# Patient Record
Sex: MALE | Race: WHITE | Employment: FULL TIME | URBAN - METROPOLITAN AREA
[De-identification: names, ages, dates, MRNs, and addresses within clinical notes are randomized per-mention and may not be internally consistent; named-entity substitution may affect disease eponyms.]

---

## 2017-05-12 ENCOUNTER — APPOINTMENT (OUTPATIENT)
Dept: GENERAL RADIOLOGY | Age: 72
End: 2017-05-12
Attending: FAMILY MEDICINE
Payer: COMMERCIAL

## 2017-05-12 ENCOUNTER — HOSPITAL ENCOUNTER (OUTPATIENT)
Age: 72
Discharge: HOME | End: 2017-05-12
Attending: FAMILY MEDICINE
Payer: COMMERCIAL

## 2017-05-12 VITALS
TEMPERATURE: 97 F | RESPIRATION RATE: 24 BRPM | DIASTOLIC BLOOD PRESSURE: 93 MMHG | HEART RATE: 63 BPM | SYSTOLIC BLOOD PRESSURE: 134 MMHG | OXYGEN SATURATION: 96 %

## 2017-05-12 DIAGNOSIS — J40 BRONCHITIS: Primary | ICD-10-CM

## 2017-05-12 PROCEDURE — 94640 AIRWAY INHALATION TREATMENT: CPT

## 2017-05-12 PROCEDURE — 99214 OFFICE O/P EST MOD 30 MIN: CPT

## 2017-05-12 PROCEDURE — 71020 XR CHEST PA + LAT CHEST (CPT=71020): CPT | Performed by: FAMILY MEDICINE

## 2017-05-12 RX ORDER — IPRATROPIUM BROMIDE AND ALBUTEROL SULFATE 2.5; .5 MG/3ML; MG/3ML
3 SOLUTION RESPIRATORY (INHALATION) ONCE
Status: COMPLETED | OUTPATIENT
Start: 2017-05-12 | End: 2017-05-12

## 2017-05-12 RX ORDER — ALBUTEROL SULFATE 2.5 MG/3ML
2.5 SOLUTION RESPIRATORY (INHALATION) EVERY 4 HOURS PRN
Qty: 30 AMPULE | Refills: 0 | Status: SHIPPED | OUTPATIENT
Start: 2017-05-12 | End: 2018-04-14

## 2017-05-12 RX ORDER — PREDNISONE 20 MG/1
40 TABLET ORAL DAILY
Qty: 10 TABLET | Refills: 0 | Status: SHIPPED | OUTPATIENT
Start: 2017-05-12 | End: 2017-05-17

## 2017-05-12 RX ORDER — ALBUTEROL SULFATE 90 UG/1
2 AEROSOL, METERED RESPIRATORY (INHALATION) EVERY 6 HOURS PRN
Qty: 1 INHALER | Refills: 0 | Status: SHIPPED | OUTPATIENT
Start: 2017-05-12 | End: 2017-10-22

## 2017-05-12 RX ORDER — AZITHROMYCIN 250 MG/1
TABLET, FILM COATED ORAL
Qty: 6 TABLET | Refills: 0 | Status: SHIPPED | OUTPATIENT
Start: 2017-05-12 | End: 2017-10-14

## 2017-05-12 NOTE — ED PROVIDER NOTES
Patient Seen in: THE [facility] In [place]    History   Patient presents with:  Shortness Of Breath    Stated Complaint: sob    HPI    42-year-old male presents for cough, congestion and shortness of breath.   States he started with cold sympto complaint: sob  Other systems are as noted in HPI. Constitutional and vital signs reviewed. All other systems reviewed and negative except as noted above. PSFH elements reviewed from today and agreed except as otherwise stated in HPI.     Physical solution for nebulizer  Monitor for worsening symptoms, follow-up with the PCP      Disposition and Plan     Clinical Impression:  Bronchitis  (primary encounter diagnosis)    Disposition:  Discharge    Follow-up:  Nonstaff, Physician  Edmar Rowe 59  Na

## 2017-05-12 NOTE — ED INITIAL ASSESSMENT (HPI)
Complains of head and nose congestion for two weeks. Started having cough and shortness of breath a week ago. States yesterday progressively got worse. Has shortness of breath on exertion.

## 2017-10-14 ENCOUNTER — HOSPITAL ENCOUNTER (OUTPATIENT)
Age: 72
Discharge: HOME | End: 2017-10-14
Attending: FAMILY MEDICINE
Payer: COMMERCIAL

## 2017-10-14 ENCOUNTER — APPOINTMENT (OUTPATIENT)
Dept: GENERAL RADIOLOGY | Age: 72
End: 2017-10-14
Attending: FAMILY MEDICINE
Payer: COMMERCIAL

## 2017-10-14 VITALS
SYSTOLIC BLOOD PRESSURE: 134 MMHG | BODY MASS INDEX: 19.64 KG/M2 | DIASTOLIC BLOOD PRESSURE: 86 MMHG | RESPIRATION RATE: 20 BRPM | WEIGHT: 145 LBS | HEART RATE: 82 BPM | OXYGEN SATURATION: 99 % | HEIGHT: 72 IN

## 2017-10-14 DIAGNOSIS — J44.1 COPD EXACERBATION (HCC): Primary | ICD-10-CM

## 2017-10-14 DIAGNOSIS — J40 BRONCHITIS: ICD-10-CM

## 2017-10-14 PROCEDURE — 94640 AIRWAY INHALATION TREATMENT: CPT

## 2017-10-14 PROCEDURE — 71020 XR CHEST PA + LAT CHEST (CPT=71020): CPT | Performed by: FAMILY MEDICINE

## 2017-10-14 PROCEDURE — 96374 THER/PROPH/DIAG INJ IV PUSH: CPT

## 2017-10-14 PROCEDURE — 99214 OFFICE O/P EST MOD 30 MIN: CPT

## 2017-10-14 RX ORDER — ALBUTEROL SULFATE 2.5 MG/3ML
2.5 SOLUTION RESPIRATORY (INHALATION) ONCE
Status: COMPLETED | OUTPATIENT
Start: 2017-10-14 | End: 2017-10-14

## 2017-10-14 RX ORDER — METHYLPREDNISOLONE SODIUM SUCCINATE 125 MG/2ML
125 INJECTION, POWDER, LYOPHILIZED, FOR SOLUTION INTRAMUSCULAR; INTRAVENOUS ONCE
Status: COMPLETED | OUTPATIENT
Start: 2017-10-14 | End: 2017-10-14

## 2017-10-14 RX ORDER — IPRATROPIUM BROMIDE AND ALBUTEROL SULFATE 2.5; .5 MG/3ML; MG/3ML
3 SOLUTION RESPIRATORY (INHALATION) ONCE
Status: COMPLETED | OUTPATIENT
Start: 2017-10-14 | End: 2017-10-14

## 2017-10-14 RX ORDER — PREDNISONE 10 MG/1
TABLET ORAL
Qty: 25 TABLET | Refills: 0 | Status: SHIPPED | OUTPATIENT
Start: 2017-10-15 | End: 2017-10-22

## 2017-10-14 RX ORDER — LEVOFLOXACIN 500 MG/1
500 TABLET, FILM COATED ORAL DAILY
Qty: 10 TABLET | Refills: 0 | Status: SHIPPED | OUTPATIENT
Start: 2017-10-14 | End: 2017-10-22

## 2017-10-14 RX ORDER — ALBUTEROL SULFATE 2.5 MG/3ML
2.5 SOLUTION RESPIRATORY (INHALATION) EVERY 4 HOURS PRN
Qty: 30 AMPULE | Refills: 0 | Status: SHIPPED | OUTPATIENT
Start: 2017-10-14 | End: 2017-10-22

## 2017-10-14 RX ORDER — ALBUTEROL SULFATE 90 UG/1
2 AEROSOL, METERED RESPIRATORY (INHALATION) EVERY 6 HOURS PRN
Qty: 1 INHALER | Refills: 0 | Status: SHIPPED | OUTPATIENT
Start: 2017-10-14 | End: 2017-11-13

## 2017-10-14 NOTE — ED INITIAL ASSESSMENT (HPI)
Shortness of breath-  X 3 weeks, denies fever or chills. Denies any chest pain. Pt uses his inhaler 1-2 x per day. , he ran out of the nebulizer medication  X 4 weeks.  Pt has h/o pneumonia 5 yrs ago

## 2017-10-14 NOTE — ED NOTES
Dr ordered to do a walking test to see if O2 changed, pt O2 stayed fine, was at 99% the whole time. Pt states he feels better after the 2nd dose of albuterol treatment.

## 2017-10-14 NOTE — ED PROVIDER NOTES
Patient Seen in: [facility] In [facility]    History   Patient presents with:  Breathing Problem    Stated Complaint: SOB X 3 DAYS    HPI  75 yo M here with complaints of being short of breath for the last 3 days, patient has had pneumonia in [place]  ABD: not distended  NEURO: Alert and oriented to person place and time  GAIT: Normal    ED Course   Labs Reviewed - No data to display      Orders Placed This Encounter      XR CHEST PA + LAT CHEST (CPT=71020) Once          Order Comments: that he has had a productive cough, congestion, and shortness of breath for three weeks. Patient states that he is a current smoker. FINDINGS:  LUNGS:  Emphysematous changes are noted. No consolidations. CARDIAC:  Normal size cardiac silhouette.  MEDIAST Soln  Take 3 mL (2.5 mg total) by nebulization every 4 (four) hours as needed for Wheezing or Shortness of Breath., Normal, Disp-30 ampule, R-0    !! predniSONE 10 MG Oral Tab  Day 2: 50mg/5pills, Day 3,4: 40 mg/4 pills, Day 5,6: 30mg/3 pills, Day 7,8: 20m

## 2017-10-20 ENCOUNTER — ANESTHESIA EVENT (OUTPATIENT)
Dept: ENDOSCOPY | Facility: HOSPITAL | Age: 72
DRG: 377 | End: 2017-10-20
Payer: COMMERCIAL

## 2017-10-20 ENCOUNTER — APPOINTMENT (OUTPATIENT)
Dept: GENERAL RADIOLOGY | Facility: HOSPITAL | Age: 72
DRG: 377 | End: 2017-10-20
Attending: EMERGENCY MEDICINE
Payer: COMMERCIAL

## 2017-10-20 ENCOUNTER — HOSPITAL ENCOUNTER (INPATIENT)
Facility: HOSPITAL | Age: 72
LOS: 2 days | Discharge: HOME | DRG: 377 | End: 2017-10-22
Attending: EMERGENCY MEDICINE | Admitting: HOSPITALIST
Payer: COMMERCIAL

## 2017-10-20 DIAGNOSIS — R63.4 WEIGHT LOSS: ICD-10-CM

## 2017-10-20 DIAGNOSIS — K92.1 MELENA: ICD-10-CM

## 2017-10-20 DIAGNOSIS — K92.2 GASTROINTESTINAL HEMORRHAGE, UNSPECIFIED GASTROINTESTINAL HEMORRHAGE TYPE: Primary | ICD-10-CM

## 2017-10-20 DIAGNOSIS — D64.9 ANEMIA, UNSPECIFIED TYPE: ICD-10-CM

## 2017-10-20 LAB
ALBUMIN SERPL-MCNC: 2.6 G/DL (ref 3.5–4.8)
ALP LIVER SERPL-CCNC: 59 U/L (ref 45–117)
ALT SERPL-CCNC: 17 U/L (ref 17–63)
ANTIBODY SCREEN: NEGATIVE
APTT PPP: 22.4 SECONDS (ref 25–34)
AST SERPL-CCNC: 12 U/L (ref 15–41)
ATRIAL RATE: 83 BPM
BASOPHILS # BLD AUTO: 0.01 X10(3) UL (ref 0–0.1)
BASOPHILS NFR BLD AUTO: 0.1 %
BILIRUB SERPL-MCNC: 0.6 MG/DL (ref 0.1–2)
BUN BLD-MCNC: 46 MG/DL (ref 8–20)
CALCIUM BLD-MCNC: 7.6 MG/DL (ref 8.3–10.3)
CHLORIDE: 102 MMOL/L (ref 101–111)
CO2: 29 MMOL/L (ref 22–32)
CREAT BLD-MCNC: 0.84 MG/DL (ref 0.7–1.3)
DEPRECATED HBV CORE AB SER IA-ACNC: 18.2 NG/ML (ref 22–322)
EOSINOPHIL # BLD AUTO: 0.02 X10(3) UL (ref 0–0.3)
EOSINOPHIL NFR BLD AUTO: 0.2 %
ERYTHROCYTE [DISTWIDTH] IN BLOOD BY AUTOMATED COUNT: 17 % (ref 11.5–16)
FOLATE (FOLIC ACID), SERUM: 10 NG/ML (ref 8.7–24)
GLUCOSE BLD-MCNC: 108 MG/DL (ref 70–99)
HAPTOGLOBIN: 147 MG/DL (ref 30–200)
HAV AB SERPL IA-ACNC: 827 PG/ML (ref 193–986)
HCT VFR BLD AUTO: 17.5 % (ref 37–53)
HGB BLD-MCNC: 5.5 G/DL (ref 13–17)
HGB BLD-MCNC: 6.9 G/DL (ref 13–17)
IMMATURE GRANULOCYTE COUNT: 0.09 X10(3) UL (ref 0–1)
IMMATURE GRANULOCYTE RATIO %: 0.9 %
INR BLD: 1.11 (ref 0.89–1.11)
IRON SATURATION: 3 % (ref 13–45)
IRON: 10 UG/DL (ref 45–182)
LDH: 189 U/L (ref 84–249)
LYMPHOCYTES # BLD AUTO: 2.37 X10(3) UL (ref 0.9–4)
LYMPHOCYTES NFR BLD AUTO: 22.7 %
M PROTEIN MFR SERPL ELPH: 4.7 G/DL (ref 6.1–8.3)
MCH RBC QN AUTO: 26.4 PG (ref 27–33.2)
MCHC RBC AUTO-ENTMCNC: 31.4 G/DL (ref 31–37)
MCV RBC AUTO: 84.1 FL (ref 80–99)
MONOCYTES # BLD AUTO: 1.03 X10(3) UL (ref 0.1–0.6)
MONOCYTES NFR BLD AUTO: 9.9 %
NEUTROPHIL ABS PRELIM: 6.91 X10 (3) UL (ref 1.3–6.7)
NEUTROPHILS # BLD AUTO: 6.91 X10(3) UL (ref 1.3–6.7)
NEUTROPHILS NFR BLD AUTO: 66.2 %
P AXIS: 79 DEGREES
P-R INTERVAL: 152 MS
PLATELET # BLD AUTO: 159 10(3)UL (ref 150–450)
POTASSIUM SERPL-SCNC: 4 MMOL/L (ref 3.6–5.1)
PSA SERPL DL<=0.01 NG/ML-MCNC: 14.4 SECONDS (ref 12–14.3)
Q-T INTERVAL: 366 MS
QRS DURATION: 80 MS
QTC CALCULATION (BEZET): 430 MS
R AXIS: -48 DEGREES
RBC # BLD AUTO: 2.08 X10(6)UL (ref 3.8–5.8)
RED CELL DISTRIBUTION WIDTH-SD: 51.6 FL (ref 35.1–46.3)
RETIC ABS CALC AUTO: 84 X10(3) UL (ref 22.5–147.5)
RETIC IRF CALC: 0.32 RATIO (ref 0.09–0.3)
RETIC%: 4 % (ref 0.5–2.5)
RETICULOCYTE HEMOGLOBIN EQUIVALENT: 27.4 PG (ref 28.2–36.3)
RH BLOOD TYPE: POSITIVE
SODIUM SERPL-SCNC: 138 MMOL/L (ref 136–144)
T AXIS: 68 DEGREES
TOTAL IRON BINDING CAPACITY: 335 UG/DL (ref 298–536)
TRANSFERRIN: 225 MG/DL (ref 200–360)
TRANSFERRIN: 225 MG/DL (ref 200–360)
TROPONIN: <0.046 NG/ML (ref ?–0.05)
VENTRICULAR RATE: 83 BPM
WBC # BLD AUTO: 10.4 X10(3) UL (ref 4–13)

## 2017-10-20 PROCEDURE — 99223 1ST HOSP IP/OBS HIGH 75: CPT | Performed by: HOSPITALIST

## 2017-10-20 PROCEDURE — 71010 XR CHEST AP PORTABLE  (CPT=71010): CPT | Performed by: EMERGENCY MEDICINE

## 2017-10-20 RX ORDER — SODIUM CHLORIDE 9 MG/ML
INJECTION, SOLUTION INTRAVENOUS CONTINUOUS
Status: DISCONTINUED | OUTPATIENT
Start: 2017-10-20 | End: 2017-10-22

## 2017-10-20 RX ORDER — SODIUM CHLORIDE 9 MG/ML
INJECTION, SOLUTION INTRAVENOUS ONCE
Status: COMPLETED | OUTPATIENT
Start: 2017-10-20 | End: 2017-10-20

## 2017-10-20 RX ORDER — IPRATROPIUM BROMIDE AND ALBUTEROL SULFATE 2.5; .5 MG/3ML; MG/3ML
3 SOLUTION RESPIRATORY (INHALATION) EVERY 4 HOURS PRN
Status: DISCONTINUED | OUTPATIENT
Start: 2017-10-20 | End: 2017-10-22

## 2017-10-20 RX ORDER — SODIUM CHLORIDE 9 MG/ML
INJECTION, SOLUTION INTRAVENOUS CONTINUOUS
Status: DISCONTINUED | OUTPATIENT
Start: 2017-10-20 | End: 2017-10-20

## 2017-10-20 RX ORDER — ONDANSETRON 2 MG/ML
4 INJECTION INTRAMUSCULAR; INTRAVENOUS EVERY 6 HOURS PRN
Status: DISCONTINUED | OUTPATIENT
Start: 2017-10-20 | End: 2017-10-20

## 2017-10-20 RX ORDER — ONDANSETRON 2 MG/ML
4 INJECTION INTRAMUSCULAR; INTRAVENOUS EVERY 6 HOURS PRN
Status: DISCONTINUED | OUTPATIENT
Start: 2017-10-20 | End: 2017-10-22

## 2017-10-20 NOTE — CONSULTS
[facility]                       Gastroenterology Consultation-[facility]    Sedrick Sanders Karunastephanie Patient Status:  Inpatient    1945 MRN [id number]   [facility] 4NW-A Attending Enoc Matta MD   Hosp Day # 0 PCP No ana Allergies  SocHx: The pt reports smoking 0.5-1 ppd; The patient denies alcohol intake; The patient has no history of IV drug use or other illicit substances. FamHx: The patient reports a cousin diagnosed with colon cancer;   No family history of ulcer dis bilaterally with rare expiratory wheeze; no increase in respiratory effort   Abdomen: Soft, non-tender, non-distended with the presence of bowel sounds; No hepatosplenomegaly; no rebound or guarding;  No ascites is clinically apparent; no tympany to percuss has not obtain medical care in 50+ yrs (except immediate care visit 2 weeks ago for minimally productive cough, dx with COPD and started on steroids and Levaquin) admitted today with weakness found to have Hgb of 5.5, no prior baseline.  He reports black st

## 2017-10-20 NOTE — DIETARY MALNUTRITION NOTE
[address] ASSESSMENT    Pt is at moderate nutrition risk. Pt meets severe malnutrition criteria.     NUTRITION DIAGNOSIS/PROBLEM:    Malnutrition related to inability to consume sufficient energy/protein as evidenced by pt with po States today when he was trying to get up he felt very lightheaded dizzy and sweaty, felt too weak to even stand therefore 911 was called.   EMS stated when the patient attempted to stand up he became diaphoretic, patient has denied ever having chest pain o

## 2017-10-20 NOTE — ANESTHESIA PREPROCEDURE EVALUATION
PRE-OP EVALUATION    Patient Name: Duc Lee    Pre-op Diagnosis: Melena [K92.1]  Weight loss [R63.4]  Anemia, unspecified type [D64.9]    Procedure(s):  ESOPHAGOGASTRODUODENOSCOPY (EGD), COLONOSCOPY  COLONOSCOPY    Surgeon(s) and Role:     * Helen (+) COPD (recent exacerbation requiring steroids)                   Neuro/Psych    Negative neuro/psych ROS.                                 Past Surgical History:  No date: TONSILLECTOMY     Smoking status: Current Every Day Smoker  0.50 Packs/day  F

## 2017-10-20 NOTE — ED PROVIDER NOTES
Patient Seen in: [facility] Emergency Department    History   Patient presents with:  Chest Pain Angina (cardiovascular)    Stated Complaint: chest pain    HPI    58-year-old male presents emergency room with chief complaint of generalized weakness. °C)  Temp src: Temporal  SpO2: 98 %  O2 Device: None (Room air)    Current:BP 95/54   Pulse 79   Temp (!) 97 °F (36.1 °C) (Temporal)   Resp 18   Ht 182.9 cm (6')   Wt 65.8 kg   SpO2 100%   BMI 19.67 kg/m²         Physical Exam    GENERAL: Patient is awake, against 0.3-0.7 heparin anti-Xa units/mL.      CBC W/ DIFFERENTIAL - Abnormal; Notable for the following:     RBC 2.08 (*)     HGB 5.5 (*)     HCT 17.5 (*)     MCH 26.4 (*)     RDW 17.0 (*)     RDW-SD 51.6 (*)     Neutrophil Absolute Prelim 6.91 (*)     Elie and pulse ox. Patient has remained hemodynamically stable throughout ER stay. Patient typed and crossed for 2 units packed red blood cells. Patient given IV fluid hydration and IV Protonix. Patient discussed with QUENTIN Man.   Patient discussed

## 2017-10-20 NOTE — ED NOTES
Called report  Dr Anatoliy Julian said that the blood can be started on the unit upon admission I informed the nurse receiving this patient that I have clearance to do so.   I called Blood bank and asked them to send the blood up via tube station to room 410

## 2017-10-20 NOTE — H&P
[facility]  History and Physical     Rylie Lee Patient Status:  Inpatient    1945 MRN [id number]   [facility] 4NW-A Attending Octaviano Agustin MD   Hosp Day # 0 PCP No primary care provider on file.      Chief Complaint: 25 tablet Rfl: 0   Albuterol Sulfate  (90 Base) MCG/ACT Inhalation Aero Soln Inhale 2 puffs into the lungs every 6 (six) hours as needed for Wheezing.  Disp: 1 Inhaler Rfl: 0   Albuterol Sulfate HFA (PROAIR HFA) 108 (90 Base) MCG/ACT Inhalation Aero cyanosis. Integument: No rashes, no lesions. Psychiatric: Appropriate mood and affect.       Diagnostic Data:      Labs:  Recent Labs   Lab  10/20/17   0747   WBC  10.4   HGB  5.5*   MCV  84.1   PLT  159.0   INR  1.11       Recent Labs   Lab  10/20/17

## 2017-10-21 ENCOUNTER — ANESTHESIA (OUTPATIENT)
Dept: ENDOSCOPY | Facility: HOSPITAL | Age: 72
DRG: 377 | End: 2017-10-21
Payer: COMMERCIAL

## 2017-10-21 ENCOUNTER — APPOINTMENT (OUTPATIENT)
Dept: CT IMAGING | Facility: HOSPITAL | Age: 72
DRG: 377 | End: 2017-10-21
Attending: HOSPITALIST
Payer: COMMERCIAL

## 2017-10-21 PROBLEM — D50.0 BLOOD LOSS ANEMIA: Status: ACTIVE | Noted: 2017-10-20

## 2017-10-21 LAB
BASOPHILS # BLD AUTO: 0.02 X10(3) UL (ref 0–0.1)
BASOPHILS NFR BLD AUTO: 0.2 %
BLOOD TYPE BARCODE: 9500
BUN BLD-MCNC: 29 MG/DL (ref 8–20)
CALCIUM BLD-MCNC: 7.5 MG/DL (ref 8.3–10.3)
CHLORIDE: 108 MMOL/L (ref 101–111)
CO2: 27 MMOL/L (ref 22–32)
CREAT BLD-MCNC: 0.58 MG/DL (ref 0.7–1.3)
EOSINOPHIL # BLD AUTO: 0.09 X10(3) UL (ref 0–0.3)
EOSINOPHIL NFR BLD AUTO: 1.1 %
ERYTHROCYTE [DISTWIDTH] IN BLOOD BY AUTOMATED COUNT: 15.8 % (ref 11.5–16)
GLUCOSE BLD-MCNC: 81 MG/DL (ref 70–99)
HAV IGM SER QL: 2.1 MG/DL (ref 1.7–3)
HCT VFR BLD AUTO: 23.3 % (ref 37–53)
HGB BLD-MCNC: 7.8 G/DL (ref 13–17)
IMMATURE GRANULOCYTE COUNT: 0.04 X10(3) UL (ref 0–1)
IMMATURE GRANULOCYTE RATIO %: 0.5 %
LYMPHOCYTES # BLD AUTO: 1.72 X10(3) UL (ref 0.9–4)
LYMPHOCYTES NFR BLD AUTO: 21.3 %
MCH RBC QN AUTO: 27.9 PG (ref 27–33.2)
MCHC RBC AUTO-ENTMCNC: 33.5 G/DL (ref 31–37)
MCV RBC AUTO: 83.2 FL (ref 80–99)
MONOCYTES # BLD AUTO: 0.78 X10(3) UL (ref 0.1–0.6)
MONOCYTES NFR BLD AUTO: 9.7 %
NEUTROPHIL ABS PRELIM: 5.42 X10 (3) UL (ref 1.3–6.7)
NEUTROPHILS # BLD AUTO: 5.42 X10(3) UL (ref 1.3–6.7)
NEUTROPHILS NFR BLD AUTO: 67.2 %
PLATELET # BLD AUTO: 120 10(3)UL (ref 150–450)
POTASSIUM SERPL-SCNC: 3.7 MMOL/L (ref 3.6–5.1)
RBC # BLD AUTO: 2.8 X10(6)UL (ref 3.8–5.8)
RED CELL DISTRIBUTION WIDTH-SD: 47.3 FL (ref 35.1–46.3)
SODIUM SERPL-SCNC: 140 MMOL/L (ref 136–144)
WBC # BLD AUTO: 8.1 X10(3) UL (ref 4–13)

## 2017-10-21 PROCEDURE — 71260 CT THORAX DX C+: CPT | Performed by: HOSPITALIST

## 2017-10-21 PROCEDURE — 30233N1 TRANSFUSION OF NONAUTOLOGOUS RED BLOOD CELLS INTO PERIPHERAL VEIN, PERCUTANEOUS APPROACH: ICD-10-PCS | Performed by: HOSPITALIST

## 2017-10-21 PROCEDURE — 74177 CT ABD & PELVIS W/CONTRAST: CPT | Performed by: HOSPITALIST

## 2017-10-21 PROCEDURE — 99232 SBSQ HOSP IP/OBS MODERATE 35: CPT | Performed by: HOSPITALIST

## 2017-10-21 RX ORDER — POTASSIUM CHLORIDE 20 MEQ/1
40 TABLET, EXTENDED RELEASE ORAL ONCE
Status: COMPLETED | OUTPATIENT
Start: 2017-10-21 | End: 2017-10-21

## 2017-10-21 RX ORDER — SODIUM CHLORIDE 9 MG/ML
INJECTION, SOLUTION INTRAVENOUS ONCE
Status: COMPLETED | OUTPATIENT
Start: 2017-10-21 | End: 2017-10-21

## 2017-10-21 NOTE — PLAN OF CARE
GASTROINTESTINAL - ADULT    • Maintains adequate nutritional intake (undernourished) Not Progressing          GASTROINTESTINAL - ADULT    • Maintains adequate nutritional intake (undernourished) Not Progressing          GASTROINTESTINAL - ADULT    • Minima

## 2017-10-21 NOTE — PLAN OF CARE
Pt tolerated two units of prbc,, no adverse reactions noted. Spoke to cat scan will come for patient in one hour,has been npo for ct scan.  Patient signed  Consent for egd

## 2017-10-21 NOTE — PLAN OF CARE
Pt AOx4, RA, VSS, afebrile, denies pain. Hgb 6.9, paged Dr. Eve Lackey, ordered 1 unit [facility], infusing, no signs of adverse rxn. CT scan delayed d/t CT scan scheduling issues, no time for bowel prep for planned EGD.  Per Dr. Negra Curiel, allow pt to e

## 2017-10-21 NOTE — PLAN OF CARE
Assumed care @ 0730. Patient denies discomfort, vital signs stable. Patient had 1 large black formed stool this morning.

## 2017-10-21 NOTE — PROGRESS NOTES
Gastroenterology Progress Note  S: CT scan delayed yesterday so did not start prep on time. No new complaints.   O: /56 (BP Location: Left arm)   Pulse 64   Temp 97.7 °F (36.5 °C) (Oral)   Resp 18   Ht 6' (1.829 m)   Wt 147 lb (66.7 kg)   SpO2 90%   B

## 2017-10-21 NOTE — PROGRESS NOTES
[facility]  Progress Note     Madhuri Lee Patient Status:  Inpatient    1945 MRN [id number]   [facility] 4NW-A Attending Roswell Sicard, MD   Hosp Day # 1 PCP No primary care provider on file.      Chief Complaint: melena gastritis/esophagitis/duodenitis  2. Was taking NSAID for HA as OP  3. EGD/c-scope carisa  4. Cont. IV PPI  2. Acute Blood loss Anemia/Fe def anemia  1. S/p 3 U PRBC 10/20  2. hgb stable  3. COPD  1. Recent flare  2. Currently appears to be compensated  3.  Ne

## 2017-10-22 ENCOUNTER — SURGERY (OUTPATIENT)
Age: 72
End: 2017-10-22

## 2017-10-22 VITALS
SYSTOLIC BLOOD PRESSURE: 109 MMHG | OXYGEN SATURATION: 99 % | BODY MASS INDEX: 20.02 KG/M2 | TEMPERATURE: 98 F | HEIGHT: 72 IN | HEART RATE: 59 BPM | WEIGHT: 147.81 LBS | RESPIRATION RATE: 18 BRPM | DIASTOLIC BLOOD PRESSURE: 63 MMHG

## 2017-10-22 LAB
BLOOD TYPE BARCODE: 5100
POTASSIUM SERPL-SCNC: 3.7 MMOL/L (ref 3.6–5.1)

## 2017-10-22 PROCEDURE — 0DBP8ZZ EXCISION OF RECTUM, VIA NATURAL OR ARTIFICIAL OPENING ENDOSCOPIC: ICD-10-PCS | Performed by: INTERNAL MEDICINE

## 2017-10-22 PROCEDURE — 0W3P8ZZ CONTROL BLEEDING IN GASTROINTESTINAL TRACT, VIA NATURAL OR ARTIFICIAL OPENING ENDOSCOPIC: ICD-10-PCS | Performed by: INTERNAL MEDICINE

## 2017-10-22 PROCEDURE — 0DBM8ZZ EXCISION OF DESCENDING COLON, VIA NATURAL OR ARTIFICIAL OPENING ENDOSCOPIC: ICD-10-PCS | Performed by: INTERNAL MEDICINE

## 2017-10-22 PROCEDURE — 99239 HOSP IP/OBS DSCHRG MGMT >30: CPT | Performed by: HOSPITALIST

## 2017-10-22 PROCEDURE — 0DBN8ZZ EXCISION OF SIGMOID COLON, VIA NATURAL OR ARTIFICIAL OPENING ENDOSCOPIC: ICD-10-PCS | Performed by: INTERNAL MEDICINE

## 2017-10-22 PROCEDURE — 3E0G8GC INTRODUCTION OF OTHER THERAPEUTIC SUBSTANCE INTO UPPER GI, VIA NATURAL OR ARTIFICIAL OPENING ENDOSCOPIC: ICD-10-PCS | Performed by: INTERNAL MEDICINE

## 2017-10-22 PROCEDURE — 0DB68ZX EXCISION OF STOMACH, VIA NATURAL OR ARTIFICIAL OPENING ENDOSCOPIC, DIAGNOSTIC: ICD-10-PCS | Performed by: INTERNAL MEDICINE

## 2017-10-22 RX ORDER — SODIUM CHLORIDE 9 MG/ML
INJECTION, SOLUTION INTRAVENOUS CONTINUOUS
Status: DISCONTINUED | OUTPATIENT
Start: 2017-10-22 | End: 2017-10-22

## 2017-10-22 RX ORDER — NALOXONE HYDROCHLORIDE 0.4 MG/ML
80 INJECTION, SOLUTION INTRAMUSCULAR; INTRAVENOUS; SUBCUTANEOUS AS NEEDED
Status: DISCONTINUED | OUTPATIENT
Start: 2017-10-22 | End: 2017-10-22

## 2017-10-22 RX ORDER — PANTOPRAZOLE SODIUM 40 MG/1
40 TABLET, DELAYED RELEASE ORAL
Qty: 60 TABLET | Refills: 0 | Status: SHIPPED | OUTPATIENT
Start: 2017-10-22 | End: 2017-11-21

## 2017-10-22 RX ORDER — POTASSIUM CHLORIDE 20 MEQ/1
40 TABLET, EXTENDED RELEASE ORAL ONCE
Status: COMPLETED | OUTPATIENT
Start: 2017-10-22 | End: 2017-10-22

## 2017-10-22 RX ORDER — ONDANSETRON 2 MG/ML
4 INJECTION INTRAMUSCULAR; INTRAVENOUS AS NEEDED
Status: DISCONTINUED | OUTPATIENT
Start: 2017-10-22 | End: 2017-10-22

## 2017-10-22 NOTE — ANESTHESIA POSTPROCEDURE EVALUATION
[facility] Patient Status:  Inpatient   Age/Gender 74year old male MRN [id number]   Location [facility]. Attending Royal Dmitriy MD   [facility] Day # 2 PCP No primary care provider on file.        Anesthesia Post-op Note

## 2017-10-22 NOTE — PROGRESS NOTES
Alert and oriented x 4, v.s.s, NPO since midnight for EGD/Colonoscopy today, pt completed half of prep, other half to be completed at 0500, stools are liquid but still dark at this time, per pt, \"its clearing up, it was much darker\".  Continues on IVF, re

## 2017-10-22 NOTE — OPERATIVE REPORT
Operative Report-Colonoscopy    PREOPERATIVE DIAGNOSIS/INDICATION:  1. Melena  2. Severe chronic blood loss anemia  POSTOPERTATIVE DIAGNOSIS:  1. Colon polyps  2. Markedly redundant colon  3.  Internal Hemorr biopsies.  - Repeat colonoscopy in 3 years  - Advance diet as tolerated  - OK for dc home from GI standpoint with PPI bid  - F/u in office in 1-2 months   - Needs repeat EGD in 12 weeks.   CC Report:   Herminia Lei  10/22/2017  10:38 AM

## 2017-10-22 NOTE — PROGRESS NOTES
NURSING DISCHARGE NOTE    Discharged Home via Ambulatory. Accompanied by Spouse  Belongings Taken by patient/family. AVS, f/u's and prescription discussed with pt and wife. Pt recommended to not take any NSAIDs for pain.  Given list of NSAIDs, dem

## 2017-10-22 NOTE — OPERATIVE REPORT
Operative Report-Esophagogastroduodenoscopy with control of bleeding      PREOPERATIVE DIAGNOSIS/INDICATION:  1. Severe anemia  2.  Melena  POSTOPERTATIVE DIAGNOSIS:  1. Multiple small and large ulcers of the antrum ranging from 5mm duodenal bulb, normal descending duodenum  THERAPEUTICS: Biopsies were performed antrum and body to r/o H.pylori  RECOMMENDATIONS:   - Post EGD precautions, watch for bleeding, infection, perforation, adverse drug reactions   - Follow biopsies and treat if

## 2017-10-23 NOTE — DISCHARGE SUMMARY
[facility]  DISCHARGE SUMMARY     Edmundo Lee Patient Status:  Inpatient    1945 MRN [id number]   [facility] 4NW-A Attending No att. providers found   [facility] Day # 2 PCP No primary care provider on file.      Date of Admission On 10/22/2017 he had EGD and colonoscopy done. Colonoscopy showed some polyps and internal hemorrhoids with redundant colon. EGD showed multiple small and large ulcers in the antrum and moderate duodenitis.   During the patient's hospital stay he was note the directions you see here. Take 3 mL (2.5 mg total) by nebulization every 4 (four) hours as needed for Wheezing or Shortness of Breath.    Quantity:  30 ampule  Refills:  0     Albuterol Sulfate  (90 Base) MCG/ACT Aers  What changed:  Another 10/23/2017    Time spent:  > 30 minutes

## 2017-10-25 NOTE — PAYOR COMM NOTE
Soila Dickerson  (MR # [id number])   Order Admit to inpatient Once [ADT1] (Order [id number])   Order Requisition   Admit to inpatient Once (Order #[id number]) on 10/20/17        Question Answer   Diagnosis Gastrointestinal hemorrhage, unspecified gastrointest GENERAL: Patient is awake, alert, pale -appearing, in no acute distress. LUNGS: Clear to auscultation bilaterally. No Rales, no rhonchi, no wheezing, no stridor. ABDOMEN: Soft, nondistended,non tender,  no rebound, no rigidity, no guarding. no pulsatile History of Present Illness: Ben Davis is a 74year old male with a past medical history of COPD. He was in immediate care last Saturday due to dyspnea. He was given steroids, breathing treatments and ABX.   He now comes to the ED due to worsening wea Diagnostic Data:      Lab  10/20/17   0747   WBC  10.4   HGB  5.5*   MCV  84.1   PLT  159.0   INR  1.11     GLU  108*   BUN  46*   CREATSERUM  0.84   CA  7.6*   ALB  2.6*   NA  138   K  4.0   CL  102   CO2  29.0   ALKPHO  59   AST  12*   ALT  17   BILT  0. HPI: This is a 74year old male who has not obtain medical care in 50+ yrs (except immediate care visit 2 weeks ago for minimally productive cough and was dx with COPD) who presented to the ER today with weakness and was found to have hemoglobin of 5.5, no 2. Clear liquid diet today; NPO after midnight  3. Golytely 2000 ml tonight, 2000 ml in AM (each dose to be completed in 1.5-2 hours)  4. Iron studies to blood in lab; CBC, BMP, Mg in AM  5. Continue to transfuse PRBC as needed to goal Hgb >7  6.  Zofran 4 10/22/17 1100 -- 60 18 106/66 99 %   10/22/17 1055 -- 59 16 93/61 99 %   10/22/17 1050 -- 59 16  88/58 100 %   10/22/17 1045 -- 58 18  82/56 100 %   10/22/17 0830 97.7 °F (36.5 °C) 66 18 125/62 99 %   10/22/17 0450 97.8 °F (36.6 °C) 60 15 123/71 95 %   10/ 1. Multiple small and large ulcers of the antrum ranging from 5mm to 20 mm, largest with peripheral flat red spot s/p injection of Epi and gold probe cautery  2.  Moderate duodenitis  PROCEDURE PERFORMED: EGD with biopsy control of bleeding  FINDINGS:  · ES

## 2017-11-01 NOTE — CDS QUERY
Potential for Impaired Nutritional Status  Joan Frederick  Dear Dr. Frandy Lagos:  Clinical information suggests potential for impaired nutritional status.  For accurate ICD-10-CM code assignment to reflect severity of illness and risk of mortal

## 2017-12-05 ENCOUNTER — APPOINTMENT (OUTPATIENT)
Dept: GENERAL RADIOLOGY | Facility: HOSPITAL | Age: 72
DRG: 191 | End: 2017-12-05
Attending: EMERGENCY MEDICINE
Payer: COMMERCIAL

## 2017-12-05 ENCOUNTER — HOSPITAL ENCOUNTER (INPATIENT)
Facility: HOSPITAL | Age: 72
LOS: 2 days | Discharge: HOME | DRG: 191 | End: 2017-12-07
Attending: EMERGENCY MEDICINE | Admitting: HOSPITALIST
Payer: COMMERCIAL

## 2017-12-05 ENCOUNTER — APPOINTMENT (OUTPATIENT)
Dept: CT IMAGING | Facility: HOSPITAL | Age: 72
DRG: 191 | End: 2017-12-05
Attending: EMERGENCY MEDICINE
Payer: COMMERCIAL

## 2017-12-05 DIAGNOSIS — J44.1 COPD EXACERBATION (HCC): ICD-10-CM

## 2017-12-05 DIAGNOSIS — R06.00 DYSPNEA ON EXERTION: Primary | ICD-10-CM

## 2017-12-05 PROBLEM — R06.09 DYSPNEA ON EXERTION: Status: ACTIVE | Noted: 2017-12-05

## 2017-12-05 PROCEDURE — 71010 XR CHEST AP PORTABLE  (CPT=71010): CPT | Performed by: EMERGENCY MEDICINE

## 2017-12-05 PROCEDURE — 99223 1ST HOSP IP/OBS HIGH 75: CPT | Performed by: HOSPITALIST

## 2017-12-05 PROCEDURE — 71275 CT ANGIOGRAPHY CHEST: CPT | Performed by: EMERGENCY MEDICINE

## 2017-12-05 RX ORDER — IPRATROPIUM BROMIDE AND ALBUTEROL SULFATE 2.5; .5 MG/3ML; MG/3ML
3 SOLUTION RESPIRATORY (INHALATION) EVERY 4 HOURS
Status: DISCONTINUED | OUTPATIENT
Start: 2017-12-05 | End: 2017-12-07

## 2017-12-05 RX ORDER — HYDROCODONE BITARTRATE AND ACETAMINOPHEN 5; 325 MG/1; MG/1
1 TABLET ORAL EVERY 4 HOURS PRN
Status: DISCONTINUED | OUTPATIENT
Start: 2017-12-05 | End: 2017-12-07

## 2017-12-05 RX ORDER — ONDANSETRON 2 MG/ML
4 INJECTION INTRAMUSCULAR; INTRAVENOUS EVERY 6 HOURS PRN
Status: DISCONTINUED | OUTPATIENT
Start: 2017-12-05 | End: 2017-12-07

## 2017-12-05 RX ORDER — MORPHINE SULFATE 4 MG/ML
4 INJECTION, SOLUTION INTRAMUSCULAR; INTRAVENOUS EVERY 2 HOUR PRN
Status: DISCONTINUED | OUTPATIENT
Start: 2017-12-05 | End: 2017-12-07

## 2017-12-05 RX ORDER — METHYLPREDNISOLONE SODIUM SUCCINATE 125 MG/2ML
60 INJECTION, POWDER, LYOPHILIZED, FOR SOLUTION INTRAMUSCULAR; INTRAVENOUS ONCE
Status: COMPLETED | OUTPATIENT
Start: 2017-12-05 | End: 2017-12-05

## 2017-12-05 RX ORDER — POLYETHYLENE GLYCOL 3350 17 G/17G
17 POWDER, FOR SOLUTION ORAL DAILY PRN
Status: DISCONTINUED | OUTPATIENT
Start: 2017-12-05 | End: 2017-12-07

## 2017-12-05 RX ORDER — HYDROCODONE BITARTRATE AND ACETAMINOPHEN 5; 325 MG/1; MG/1
2 TABLET ORAL EVERY 4 HOURS PRN
Status: DISCONTINUED | OUTPATIENT
Start: 2017-12-05 | End: 2017-12-07

## 2017-12-05 RX ORDER — PANTOPRAZOLE SODIUM 40 MG/1
40 TABLET, DELAYED RELEASE ORAL
Status: DISCONTINUED | OUTPATIENT
Start: 2017-12-06 | End: 2017-12-07

## 2017-12-05 RX ORDER — NICOTINE 21 MG/24HR
1 PATCH, TRANSDERMAL 24 HOURS TRANSDERMAL DAILY
Status: DISCONTINUED | OUTPATIENT
Start: 2017-12-05 | End: 2017-12-07

## 2017-12-05 RX ORDER — SODIUM PHOSPHATE, DIBASIC AND SODIUM PHOSPHATE, MONOBASIC 7; 19 G/133ML; G/133ML
1 ENEMA RECTAL ONCE AS NEEDED
Status: DISCONTINUED | OUTPATIENT
Start: 2017-12-05 | End: 2017-12-07

## 2017-12-05 RX ORDER — DOCUSATE SODIUM 100 MG/1
100 CAPSULE, LIQUID FILLED ORAL 2 TIMES DAILY
Status: DISCONTINUED | OUTPATIENT
Start: 2017-12-05 | End: 2017-12-07

## 2017-12-05 RX ORDER — BISACODYL 10 MG
10 SUPPOSITORY, RECTAL RECTAL
Status: DISCONTINUED | OUTPATIENT
Start: 2017-12-05 | End: 2017-12-07

## 2017-12-05 RX ORDER — ENOXAPARIN SODIUM 100 MG/ML
40 INJECTION SUBCUTANEOUS NIGHTLY
Status: DISCONTINUED | OUTPATIENT
Start: 2017-12-05 | End: 2017-12-07

## 2017-12-05 RX ORDER — ACETAMINOPHEN 325 MG/1
650 TABLET ORAL EVERY 4 HOURS PRN
Status: DISCONTINUED | OUTPATIENT
Start: 2017-12-05 | End: 2017-12-07

## 2017-12-05 RX ORDER — IPRATROPIUM BROMIDE AND ALBUTEROL SULFATE 2.5; .5 MG/3ML; MG/3ML
3 SOLUTION RESPIRATORY (INHALATION) ONCE
Status: COMPLETED | OUTPATIENT
Start: 2017-12-05 | End: 2017-12-05

## 2017-12-05 RX ORDER — SODIUM CHLORIDE 9 MG/ML
INJECTION, SOLUTION INTRAVENOUS CONTINUOUS
Status: ACTIVE | OUTPATIENT
Start: 2017-12-05 | End: 2017-12-05

## 2017-12-05 RX ORDER — METHYLPREDNISOLONE SODIUM SUCCINATE 125 MG/2ML
60 INJECTION, POWDER, LYOPHILIZED, FOR SOLUTION INTRAMUSCULAR; INTRAVENOUS EVERY 12 HOURS
Status: DISCONTINUED | OUTPATIENT
Start: 2017-12-06 | End: 2017-12-06

## 2017-12-05 RX ORDER — MORPHINE SULFATE 4 MG/ML
1 INJECTION, SOLUTION INTRAMUSCULAR; INTRAVENOUS EVERY 2 HOUR PRN
Status: DISCONTINUED | OUTPATIENT
Start: 2017-12-05 | End: 2017-12-07

## 2017-12-05 RX ORDER — SODIUM CHLORIDE 9 MG/ML
INJECTION, SOLUTION INTRAVENOUS CONTINUOUS
Status: DISCONTINUED | OUTPATIENT
Start: 2017-12-05 | End: 2017-12-07

## 2017-12-05 RX ORDER — TRAZODONE HYDROCHLORIDE 50 MG/1
50 TABLET ORAL NIGHTLY PRN
Status: DISCONTINUED | OUTPATIENT
Start: 2017-12-05 | End: 2017-12-07

## 2017-12-05 RX ORDER — MORPHINE SULFATE 4 MG/ML
2 INJECTION, SOLUTION INTRAMUSCULAR; INTRAVENOUS EVERY 2 HOUR PRN
Status: DISCONTINUED | OUTPATIENT
Start: 2017-12-05 | End: 2017-12-07

## 2017-12-05 NOTE — H&P
[facility]  History and Physical     Rubio Roberson Piyushrancho Patient Status:  Emergency    1945 MRN [id number]   Location [facility] Attending Jennifer Gandhi MD   Hosp Day # 0 PCP No primary care provider on file. Rfl: 0   Albuterol Sulfate HFA (PROAIR HFA) 108 (90 BASE) MCG/ACT Inhalation Aero Soln Inhale 2 puffs into the lungs every 6 (six) hours as needed for Wheezing.  Disp: 1 Inhaler Rfl: 0       Review of Systems:   A comprehensive 14 point review of systems wa for now   2. Anemia, chronic - above baseline   1. ppi while on steroids   3. Hyponatremia   1. IVF   4.  Current tobacco use      Quality:  · DVT Prophylaxis: lovenox  · CODE status: full  · Lockhart: none    Plan of care discussed with patient, er     [facility]

## 2017-12-05 NOTE — ED INITIAL ASSESSMENT (HPI)
Patient reports generalized weakness this am and had fall onto buttocks around 12 pm today. Reports increasing sob since falling with hx of COPD. Admits to congested cough x several weeks.

## 2017-12-05 NOTE — ED PROVIDER NOTES
Patient Seen in: [facility] Emergency Department    History   Patient presents with:  Dyspnea VITO SOB (respiratory)    Stated Complaint: sob    HPI    38-year-old white male who presents emergency room today feeling of difficulty breathing.   Patient appears in no acute discomfort or distress. Vital signs are stable he is afebrile    Head is normocephalic atraumatic conjunctiva is clear. Sclerae anicteric. Neck is supple. Lungs are diminished with wheezing to auscultation bilaterally.   Heart is -----------         ------                     CBC W/ DIFFERENTIAL[[id number]]          Abnormal            Final result                 Please view results for these tests on the individual orders.    [facility] Clinical Impression:  Dyspnea on exertion  (primary encounter diagnosis)    Disposition:  There is no disposition on file for this visit. There is no disposition time on file for this visit. Follow-up:  No follow-up provider specified.       Nico Saldivar

## 2017-12-06 PROCEDURE — 99232 SBSQ HOSP IP/OBS MODERATE 35: CPT | Performed by: INTERNAL MEDICINE

## 2017-12-06 RX ORDER — METHYLPREDNISOLONE SODIUM SUCCINATE 125 MG/2ML
60 INJECTION, POWDER, LYOPHILIZED, FOR SOLUTION INTRAMUSCULAR; INTRAVENOUS EVERY 8 HOURS
Status: COMPLETED | OUTPATIENT
Start: 2017-12-06 | End: 2017-12-06

## 2017-12-06 NOTE — PROGRESS NOTES
Pt a/ox4. RA. Lungs clear. IS given to patient. IS = 1000. IS goal of 1250. Pt encouraged to use IS 10x an hour. Duonebs. Plan to transition to PO steroids tomorrow. NSR. BM today. Denies pain. Call light in reach. Will continue to monitor.

## 2017-12-06 NOTE — PROGRESS NOTES
[facility]  Progress Note     Madhuri Lee Patient Status:  Inpatient    1945 MRN [id number]   [facility] 8NE-A Attending Franco Lenz MD   Hosp Day # 1 PCP No primary care provider on file.      Chief Complaint: SOB, cough Q12H   • Pantoprazole Sodium  40 mg Oral QAM AC       ASSESSMENT / PLAN:     1. Acute COPD exacerbation  1. Negative CT Angiography,   2. negative viral panel,   3. Cont steroids, nebs   4. Wean off O2  2.  Anemia, chronic - above baseline   1. ppi while on

## 2017-12-06 NOTE — PROCEDURES
This test includes spirometry and flow volume loop done at the bedside during an inpatient hospitalization. Spirometry and  flow volume loop show evidence of severe obstruction. Spirometry  suggests  restriction.  Complete PFT with lung volume measur

## 2017-12-06 NOTE — PAYOR COMM NOTE
--------------  Lul Marte  (MR # [id number])   Order Admit to inpatient Once [ADT1] (Order [id number])   Order Requisition   Admit to inpatient Once (Order #[id number]) on 12/5/17        Question Answer   Diagnosis Dyspnea on exertion   Level of Care Te for the following:     Sodium 132 (*)     Chloride 95 (*)    URINALYSIS WITH CULTURE REFLEX - Abnormal; Notable for the following:     Clarity Urine Hazy (*)     Ketones Urine Trace (*)     Mucous Urine 1+ (*)    CBC W/ DIFFERENTIAL - Abnormal; Notable for fell onto his butt. This prompted him to come to the Er. During last admission patient was encouraged to establish opt f/u but has opted not to do so, and takes no meds at home.     In ER patient with partial response to neb treatment, admitted for dyspn Soledad Salazar      ipratropium-albuterol (DUONEB) nebulizer solution 3 mL     Date Action Dose Route User    12/6/2017 0818 Given 3 mL Nebulization Sandria Area    12/6/2017 0318 Given 3 mL Nebulization Citlaly Austin RCP    12/5/20 hypoxia pO2 55.

## 2017-12-06 NOTE — HOME CARE LIAISON
Got verbal referral from rounds. Will see the patient, thank you. Met with patient and he is declining [facility] services at this time.

## 2017-12-06 NOTE — PROGRESS NOTES
Received patient at change of shift resting in bed. VSS. Afebrile. RR- 20 and non labored. Lung sounds diminished in upper and lower posterior lobes upon ascultation. Tele shows NSR. Hr - 79 - 80's. Denies complaints of chest pain, sob or dizziness.    Lucius

## 2017-12-06 NOTE — PLAN OF CARE
A/ox4. On RA. sats 94-98%. Lungs diminished. Productive cough with clear sputum. Awaiting sputum cx results. Pt does not use home 02. Tele-SR. Plan to possibly begin oral steroids today if doing well per MD. Voiding. Denies pain. Up to BR. IVF.  Possible ho

## 2017-12-07 VITALS
TEMPERATURE: 98 F | DIASTOLIC BLOOD PRESSURE: 68 MMHG | OXYGEN SATURATION: 96 % | BODY MASS INDEX: 20.99 KG/M2 | WEIGHT: 155 LBS | HEART RATE: 80 BPM | HEIGHT: 72 IN | RESPIRATION RATE: 18 BRPM | SYSTOLIC BLOOD PRESSURE: 125 MMHG

## 2017-12-07 PROCEDURE — 99239 HOSP IP/OBS DSCHRG MGMT >30: CPT | Performed by: INTERNAL MEDICINE

## 2017-12-07 RX ORDER — ALBUTEROL SULFATE 90 UG/1
2 AEROSOL, METERED RESPIRATORY (INHALATION) EVERY 6 HOURS PRN
Qty: 1 INHALER | Refills: 0 | Status: SHIPPED | OUTPATIENT
Start: 2017-12-07 | End: 2018-01-31

## 2017-12-07 RX ORDER — PREDNISONE 20 MG/1
60 TABLET ORAL
Qty: 13 TABLET | Refills: 0 | Status: SHIPPED | OUTPATIENT
Start: 2017-12-08 | End: 2018-01-31 | Stop reason: ALTCHOICE

## 2017-12-07 NOTE — PLAN OF CARE
RESPIRATORY - ADULT    • Achieves optimal ventilation and oxygenation Progressing        Alert and oriented times 4. VSS. Tele shows NSR with occas pvc's. Denies complaints of pain or discomfort.

## 2017-12-07 NOTE — DISCHARGE SUMMARY
[facility]  DISCHARGE SUMMARY     Verna Lee Patient Status:  Inpatient    1945 MRN [id number]   [facility] 8NE-A Attending No att. providers found   Hosp Day # 2 PCP No primary care provider on file.      Date of Admission been negative. He was managed with steroid, nebulizer treatment and improved significantly. He will need outpatient PFT with his pulmonologist and advised to stop smoking.      Procedures during hospitalization:   • none    Incidental or significant finding Aers  · Fluticasone Furoate-Vilanterol 100-25 MCG/INH Aepb  · predniSONE 20 MG Tabs         Follow-up appointment:   Sherice Paul MD  [address] [place] [address]  [phone]    Schedule an appointment as soon as possible for a vis

## 2017-12-07 NOTE — PLAN OF CARE
Pt a/ox4. RA. Lungs clear. Productive cough. Denies pain and SOB. Up ad anne-marie. Transitioned to PO steroids today. Call light in reach. Will continue to monitor.

## 2017-12-20 NOTE — PAYOR COMM NOTE
PLEASE FAX DETERMINATION ASAP, CLINICAL WAS SENT 2 WEEKS AGO    DISCHARGE REVIEW    Payor: [address] #:  [id number]  Authorization Number: [id number]    Admit date: 12/5/17  Admit time:  1845  Discharge Date: 12/7/2017    During last admission patient was encouraged to establish opt f/u but has opted not to do so, and takes no meds at home.      In ER patient with partial response to neb treatment, admitted for dyspnea.      Brief Synopsis: patient admitted for acute CO Brimonidine Tartrate-Timolol      Place 1 drop into both eyes every 12 (twelve) hours.    Refills:  0           Where to Get Your Medications      Please  your prescriptions at the location directed by your doctor or nurse    Bring a paper prescripti

## 2018-01-31 PROBLEM — I71.9 AORTIC ANEURYSM WITHOUT RUPTURE, UNSPECIFIED PORTION OF AORTA (HCC): Status: ACTIVE | Noted: 2018-01-31

## 2018-03-20 ENCOUNTER — LAB ENCOUNTER (OUTPATIENT)
Dept: LAB | Age: 73
End: 2018-03-20
Attending: FAMILY MEDICINE
Payer: COMMERCIAL

## 2018-03-20 DIAGNOSIS — J44.9 CHRONIC OBSTRUCTIVE PULMONARY DISEASE, UNSPECIFIED COPD TYPE (HCC): ICD-10-CM

## 2018-03-20 DIAGNOSIS — D50.0 BLOOD LOSS ANEMIA: ICD-10-CM

## 2018-03-20 DIAGNOSIS — H02.003 ENTROPION OF RIGHT EYELID: ICD-10-CM

## 2018-03-20 DIAGNOSIS — Z23 NEED FOR PROPHYLACTIC VACCINATION AGAINST STREPTOCOCCUS PNEUMONIAE (PNEUMOCOCCUS): ICD-10-CM

## 2018-03-20 DIAGNOSIS — Z72.0 TOBACCO USE: ICD-10-CM

## 2018-03-20 DIAGNOSIS — I71.9 AORTIC ANEURYSM WITHOUT RUPTURE, UNSPECIFIED PORTION OF AORTA (HCC): ICD-10-CM

## 2018-03-20 LAB
ALBUMIN SERPL-MCNC: 3.7 G/DL (ref 3.5–4.8)
ALP LIVER SERPL-CCNC: 104 U/L (ref 45–117)
ALT SERPL-CCNC: 17 U/L (ref 17–63)
AST SERPL-CCNC: 19 U/L (ref 15–41)
BASOPHILS # BLD AUTO: 0.04 X10(3) UL (ref 0–0.1)
BASOPHILS NFR BLD AUTO: 0.5 %
BILIRUB SERPL-MCNC: 0.6 MG/DL (ref 0.1–2)
BUN BLD-MCNC: 17 MG/DL (ref 8–20)
CALCIUM BLD-MCNC: 8.4 MG/DL (ref 8.3–10.3)
CHLORIDE: 110 MMOL/L (ref 101–111)
CHOLEST SMN-MCNC: 167 MG/DL (ref ?–200)
CO2: 26 MMOL/L (ref 22–32)
CREAT BLD-MCNC: 0.95 MG/DL (ref 0.7–1.3)
EOSINOPHIL # BLD AUTO: 0.12 X10(3) UL (ref 0–0.3)
EOSINOPHIL NFR BLD AUTO: 1.5 %
ERYTHROCYTE [DISTWIDTH] IN BLOOD BY AUTOMATED COUNT: 21.4 % (ref 11.5–16)
GLUCOSE BLD-MCNC: 89 MG/DL (ref 70–99)
HCT VFR BLD AUTO: 37.7 % (ref 37–53)
HDLC SERPL-MCNC: 45 MG/DL (ref 45–?)
HDLC SERPL: 3.71 {RATIO} (ref ?–4.97)
HGB BLD-MCNC: 10.8 G/DL (ref 13–17)
IMMATURE GRANULOCYTE COUNT: 0.01 X10(3) UL (ref 0–1)
IMMATURE GRANULOCYTE RATIO %: 0.1 %
LDLC SERPL CALC-MCNC: 104 MG/DL (ref ?–130)
LYMPHOCYTES # BLD AUTO: 3.13 X10(3) UL (ref 0.9–4)
LYMPHOCYTES NFR BLD AUTO: 40.1 %
M PROTEIN MFR SERPL ELPH: 6.8 G/DL (ref 6.1–8.3)
MCH RBC QN AUTO: 21.5 PG (ref 27–33.2)
MCHC RBC AUTO-ENTMCNC: 28.6 G/DL (ref 31–37)
MCV RBC AUTO: 75 FL (ref 80–99)
MONOCYTES # BLD AUTO: 0.8 X10(3) UL (ref 0.1–1)
MONOCYTES NFR BLD AUTO: 10.2 %
NEUTROPHIL ABS PRELIM: 3.71 X10 (3) UL (ref 1.3–6.7)
NEUTROPHILS # BLD AUTO: 3.71 X10(3) UL (ref 1.3–6.7)
NEUTROPHILS NFR BLD AUTO: 47.6 %
NONHDLC SERPL-MCNC: 122 MG/DL (ref ?–130)
PLATELET # BLD AUTO: 168 10(3)UL (ref 150–450)
POTASSIUM SERPL-SCNC: 4.3 MMOL/L (ref 3.6–5.1)
PSA SERPL-MCNC: 1.51 NG/ML (ref 0.01–4)
RBC # BLD AUTO: 5.03 X10(6)UL (ref 3.8–5.8)
RED CELL DISTRIBUTION WIDTH-SD: 57 FL (ref 35.1–46.3)
SODIUM SERPL-SCNC: 145 MMOL/L (ref 136–144)
TRIGL SERPL-MCNC: 91 MG/DL (ref ?–150)
TSI SER-ACNC: 3.44 MIU/ML (ref 0.35–5.5)
VLDLC SERPL CALC-MCNC: 18 MG/DL (ref 5–40)
WBC # BLD AUTO: 7.8 X10(3) UL (ref 4–13)

## 2018-03-20 PROCEDURE — 84153 ASSAY OF PSA TOTAL: CPT

## 2018-03-20 PROCEDURE — 85025 COMPLETE CBC W/AUTO DIFF WBC: CPT

## 2018-03-20 PROCEDURE — 80050 GENERAL HEALTH PANEL: CPT

## 2018-03-20 PROCEDURE — 80061 LIPID PANEL: CPT

## 2018-03-20 PROCEDURE — 36415 COLL VENOUS BLD VENIPUNCTURE: CPT

## 2018-03-25 ENCOUNTER — HOSPITAL ENCOUNTER (OUTPATIENT)
Age: 73
Discharge: HOME | End: 2018-03-25
Attending: FAMILY MEDICINE
Payer: COMMERCIAL

## 2018-03-25 VITALS
DIASTOLIC BLOOD PRESSURE: 89 MMHG | SYSTOLIC BLOOD PRESSURE: 153 MMHG | RESPIRATION RATE: 20 BRPM | OXYGEN SATURATION: 95 % | TEMPERATURE: 98 F | HEART RATE: 80 BPM

## 2018-03-25 DIAGNOSIS — H10.32 ACUTE CONJUNCTIVITIS OF LEFT EYE, UNSPECIFIED ACUTE CONJUNCTIVITIS TYPE: Primary | ICD-10-CM

## 2018-03-25 DIAGNOSIS — Z76.0 ENCOUNTER FOR MEDICATION REFILL: ICD-10-CM

## 2018-03-25 PROCEDURE — 99214 OFFICE O/P EST MOD 30 MIN: CPT

## 2018-03-25 PROCEDURE — 99213 OFFICE O/P EST LOW 20 MIN: CPT

## 2018-03-25 RX ORDER — CIPROFLOXACIN HYDROCHLORIDE 3.5 MG/ML
SOLUTION/ DROPS TOPICAL
Qty: 1 BOTTLE | Refills: 0 | Status: SHIPPED | OUTPATIENT
Start: 2018-03-25 | End: 2018-03-31

## 2018-03-25 RX ORDER — ALBUTEROL SULFATE 90 UG/1
2 AEROSOL, METERED RESPIRATORY (INHALATION) EVERY 6 HOURS PRN
Qty: 1 INHALER | Refills: 0 | Status: SHIPPED | OUTPATIENT
Start: 2018-03-25 | End: 2018-04-14

## 2018-03-25 NOTE — ED PROVIDER NOTES
Patient Seen in: THE [facility] In [place]    History   Patient presents with:  Eye Pain    Stated Complaint: eye pain     HPI  73 yo M here with L eye redness and drainage after being exposed to his grandchild with similar symptoms, now on me pallor, no erythema, no cyanosis, warm and dry  Eyes: R eye - moist and appears that he has applied his ointment - eyelid healing well. L eye - erythematous and tearing and drainage noted.  Mattering or eyelids +    HEAD: Normocephalic, atraumatic  EENT: OP

## 2018-03-25 NOTE — ED INITIAL ASSESSMENT (HPI)
Left eye - pain x 2-3 days, itching , tearing . Granddaughter has pink eye. Pt applied ointment neomycin/polymyxin (prescribed for right eye post surgery)  for the burning ans it seemed to help per pt. Pt uses [address] for glaucoma.  Pt was suppose to hav

## 2018-04-14 ENCOUNTER — APPOINTMENT (OUTPATIENT)
Dept: GENERAL RADIOLOGY | Age: 73
End: 2018-04-14
Attending: FAMILY MEDICINE
Payer: COMMERCIAL

## 2018-04-14 ENCOUNTER — HOSPITAL ENCOUNTER (OUTPATIENT)
Age: 73
Discharge: HOME | End: 2018-04-14
Attending: FAMILY MEDICINE
Payer: COMMERCIAL

## 2018-04-14 VITALS
OXYGEN SATURATION: 95 % | DIASTOLIC BLOOD PRESSURE: 79 MMHG | HEART RATE: 79 BPM | SYSTOLIC BLOOD PRESSURE: 132 MMHG | TEMPERATURE: 98 F | RESPIRATION RATE: 18 BRPM

## 2018-04-14 DIAGNOSIS — J06.9 UPPER RESPIRATORY TRACT INFECTION, UNSPECIFIED TYPE: ICD-10-CM

## 2018-04-14 DIAGNOSIS — S20.211A CONTUSION OF RIB ON RIGHT SIDE, INITIAL ENCOUNTER: Primary | ICD-10-CM

## 2018-04-14 PROCEDURE — 99213 OFFICE O/P EST LOW 20 MIN: CPT

## 2018-04-14 PROCEDURE — 71101 X-RAY EXAM UNILAT RIBS/CHEST: CPT | Performed by: FAMILY MEDICINE

## 2018-04-14 PROCEDURE — 99214 OFFICE O/P EST MOD 30 MIN: CPT

## 2018-04-14 RX ORDER — FLUTICASONE PROPIONATE 50 MCG
2 SPRAY, SUSPENSION (ML) NASAL DAILY
Qty: 16 G | Refills: 0 | Status: SHIPPED | OUTPATIENT
Start: 2018-04-14 | End: 2018-05-14

## 2018-04-14 RX ORDER — ALBUTEROL SULFATE 90 UG/1
2 AEROSOL, METERED RESPIRATORY (INHALATION) EVERY 6 HOURS PRN
Qty: 1 INHALER | Refills: 0 | Status: SHIPPED | OUTPATIENT
Start: 2018-04-14 | End: 2018-09-04

## 2018-04-14 NOTE — ED INITIAL ASSESSMENT (HPI)
Pt here with c/o right rib pain s/p fall this morning. Pt states he tripped over a cable and hit is side against something. He denies head injury.

## 2018-04-15 NOTE — ED PROVIDER NOTES
Patient Seen in: [facility] In [facility]    History   Patient presents with:  Trauma (cardiovascular, musculoskeletal)    Stated Complaint: r side rib pain - s/p fall    HPI    74year old male presents for right lower rib cage pain after fa Device: None (Room air)    Current:/79   Pulse 79   Temp 98.3 °F (36.8 °C) (Temporal)   Resp 18   SpO2 95%         Physical Exam   Constitutional: He is oriented to person, place, and time. He appears well-developed and well-nourished.    HENT:   [address]  [address] [facility] [id number]  [phone]    In 3 days  If symptoms worsen        Medications Prescribed:  Discharge Medication List as of 4/14/2018  3:49 PM    START taking these medications    Albuterol Sulfate HFA (PROAIR HFA) 108 (90 Base) MCG

## 2019-10-08 ENCOUNTER — APPOINTMENT (OUTPATIENT)
Dept: GENERAL RADIOLOGY | Facility: HOSPITAL | Age: 74
DRG: 191 | End: 2019-10-08
Attending: EMERGENCY MEDICINE
Payer: COMMERCIAL

## 2019-10-08 ENCOUNTER — HOSPITAL ENCOUNTER (INPATIENT)
Facility: HOSPITAL | Age: 74
LOS: 3 days | Discharge: HOME | DRG: 191 | End: 2019-10-11
Attending: EMERGENCY MEDICINE | Admitting: INTERNAL MEDICINE
Payer: COMMERCIAL

## 2019-10-08 DIAGNOSIS — J44.1 COPD EXACERBATION (HCC): Primary | ICD-10-CM

## 2019-10-08 PROCEDURE — 85025 COMPLETE CBC W/AUTO DIFF WBC: CPT | Performed by: EMERGENCY MEDICINE

## 2019-10-08 PROCEDURE — 99291 CRITICAL CARE FIRST HOUR: CPT

## 2019-10-08 PROCEDURE — 93010 ELECTROCARDIOGRAM REPORT: CPT

## 2019-10-08 PROCEDURE — 93005 ELECTROCARDIOGRAM TRACING: CPT

## 2019-10-08 PROCEDURE — 80053 COMPREHEN METABOLIC PANEL: CPT | Performed by: EMERGENCY MEDICINE

## 2019-10-08 PROCEDURE — 94645 CONT INHLJ TX EACH ADDL HOUR: CPT

## 2019-10-08 PROCEDURE — 96367 TX/PROPH/DG ADDL SEQ IV INF: CPT

## 2019-10-08 PROCEDURE — 94640 AIRWAY INHALATION TREATMENT: CPT

## 2019-10-08 PROCEDURE — 83880 ASSAY OF NATRIURETIC PEPTIDE: CPT | Performed by: EMERGENCY MEDICINE

## 2019-10-08 PROCEDURE — 96365 THER/PROPH/DIAG IV INF INIT: CPT

## 2019-10-08 PROCEDURE — 71045 X-RAY EXAM CHEST 1 VIEW: CPT | Performed by: EMERGENCY MEDICINE

## 2019-10-08 PROCEDURE — 84484 ASSAY OF TROPONIN QUANT: CPT | Performed by: EMERGENCY MEDICINE

## 2019-10-08 RX ORDER — ONDANSETRON 2 MG/ML
4 INJECTION INTRAMUSCULAR; INTRAVENOUS EVERY 6 HOURS PRN
Status: DISCONTINUED | OUTPATIENT
Start: 2019-10-08 | End: 2019-10-11

## 2019-10-08 RX ORDER — SODIUM PHOSPHATE, DIBASIC AND SODIUM PHOSPHATE, MONOBASIC 7; 19 G/133ML; G/133ML
1 ENEMA RECTAL ONCE AS NEEDED
Status: DISCONTINUED | OUTPATIENT
Start: 2019-10-08 | End: 2019-10-11

## 2019-10-08 RX ORDER — IPRATROPIUM BROMIDE AND ALBUTEROL SULFATE 2.5; .5 MG/3ML; MG/3ML
3 SOLUTION RESPIRATORY (INHALATION) ONCE
Status: COMPLETED | OUTPATIENT
Start: 2019-10-08 | End: 2019-10-08

## 2019-10-08 RX ORDER — METOCLOPRAMIDE HYDROCHLORIDE 5 MG/ML
10 INJECTION INTRAMUSCULAR; INTRAVENOUS EVERY 8 HOURS PRN
Status: DISCONTINUED | OUTPATIENT
Start: 2019-10-08 | End: 2019-10-11

## 2019-10-08 RX ORDER — METHYLPREDNISOLONE SODIUM SUCCINATE 125 MG/2ML
60 INJECTION, POWDER, LYOPHILIZED, FOR SOLUTION INTRAMUSCULAR; INTRAVENOUS EVERY 6 HOURS
Status: DISCONTINUED | OUTPATIENT
Start: 2019-10-08 | End: 2019-10-11

## 2019-10-08 RX ORDER — DOCUSATE SODIUM 100 MG/1
100 CAPSULE, LIQUID FILLED ORAL 2 TIMES DAILY
Status: DISCONTINUED | OUTPATIENT
Start: 2019-10-08 | End: 2019-10-11

## 2019-10-08 RX ORDER — ENOXAPARIN SODIUM 100 MG/ML
40 INJECTION SUBCUTANEOUS DAILY
Status: DISCONTINUED | OUTPATIENT
Start: 2019-10-08 | End: 2019-10-11

## 2019-10-08 RX ORDER — IPRATROPIUM BROMIDE AND ALBUTEROL SULFATE 2.5; .5 MG/3ML; MG/3ML
3 SOLUTION RESPIRATORY (INHALATION)
Status: DISCONTINUED | OUTPATIENT
Start: 2019-10-08 | End: 2019-10-11

## 2019-10-08 RX ORDER — ACETAMINOPHEN 325 MG/1
650 TABLET ORAL EVERY 6 HOURS PRN
Status: DISCONTINUED | OUTPATIENT
Start: 2019-10-08 | End: 2019-10-11

## 2019-10-08 RX ORDER — POLYETHYLENE GLYCOL 3350 17 G/17G
17 POWDER, FOR SOLUTION ORAL DAILY PRN
Status: DISCONTINUED | OUTPATIENT
Start: 2019-10-08 | End: 2019-10-11

## 2019-10-08 RX ORDER — BISACODYL 10 MG
10 SUPPOSITORY, RECTAL RECTAL
Status: DISCONTINUED | OUTPATIENT
Start: 2019-10-08 | End: 2019-10-11

## 2019-10-08 NOTE — ED PROVIDER NOTES
Patient Seen in: [facility] Emergency Department      History   Patient presents with:  Dyspnea VITO SOB (respiratory)    Stated Complaint: dyspnea, hx of copd    HPI    18-year-old male with history of having COPD comes the hospital complaint of hav Device None (Room air)       Current:/79   Pulse 87   Temp 98.5 °F (36.9 °C) (Oral)   Resp 22   Ht 182.9 cm (6')   Wt 72.6 kg   SpO2 95%   BMI 21.70 kg/m²         Physical Exam    HEENT : NCAT, EOMI, PEERL, throat clear, neck supple, no JVD, trachea individual orders. EKG    Rate, intervals and axes as noted on EKG Report.   Rate: 90  Rhythm: Sinus Rhythm  Reading: agreed              Xr Chest Ap Portable  (cpt=71045)    Result Date: 10/8/2019  PROCEDURE:  XR CHEST AP PORTABLE  (CPT=71045)  TECHNIQ arrived with a condition with significant morbidity and mortality associated.  The services I provided  were to promote improvement and reduce mortality specifically involving complex record review, complex medical decisions and interventions, and consultat

## 2019-10-08 NOTE — ED INITIAL ASSESSMENT (HPI)
Patient started with head cold on Friday. Went to pharmacy and started on mucinex DM. Patient feels worse and weak since then. Today patient has been barely able to walk, and fell at home. Patient having increased SOB.  Patient does have COPD- does not wear

## 2019-10-09 PROCEDURE — 85025 COMPLETE CBC W/AUTO DIFF WBC: CPT | Performed by: INTERNAL MEDICINE

## 2019-10-09 PROCEDURE — 94640 AIRWAY INHALATION TREATMENT: CPT

## 2019-10-09 PROCEDURE — 84145 PROCALCITONIN (PCT): CPT | Performed by: INTERNAL MEDICINE

## 2019-10-09 PROCEDURE — 80048 BASIC METABOLIC PNL TOTAL CA: CPT | Performed by: INTERNAL MEDICINE

## 2019-10-09 RX ORDER — SODIUM CHLORIDE 9 MG/ML
INJECTION, SOLUTION INTRAVENOUS CONTINUOUS
Status: DISCONTINUED | OUTPATIENT
Start: 2019-10-09 | End: 2019-10-11

## 2019-10-09 RX ORDER — AZITHROMYCIN 250 MG/1
250 TABLET, FILM COATED ORAL
Status: DISCONTINUED | OUTPATIENT
Start: 2019-10-09 | End: 2019-10-11

## 2019-10-09 RX ORDER — PANTOPRAZOLE SODIUM 40 MG/1
40 TABLET, DELAYED RELEASE ORAL
Status: DISCONTINUED | OUTPATIENT
Start: 2019-10-09 | End: 2019-10-11

## 2019-10-09 NOTE — PLAN OF CARE
Problem: Patient/Family Goals  Goal: Patient/Family Long Term Goal  Description  Patient's Long Term Goal:   D/c with appropriate resources    Interventions:  - comply with POC  - See additional Care Plan goals for specific interventions   Outcome: Progr

## 2019-10-09 NOTE — PLAN OF CARE
Alert x1-2, baseline. Hx dementia. 3LNC, baseline room air. Tele monitoring. Eliquis for hx afib. Briefed, occasionally incontinent. Droplet isolation. Refuses to comply with any POC- refuses physical assessment, medication, submissive of MD and RN.  Son, Jen Gamino

## 2019-10-09 NOTE — PROGRESS NOTES
NURSING ADMISSION NOTE      Patient admitted via [address] to room 517  Safety precautions initiated. Bed in low position. Call light in reach. Admission navigator completed by admission RN. Contact PLUS isolation for rule out cdiff.  Sepsis B

## 2019-10-09 NOTE — CONSULTS
Pulmonary H&P/Consult       NAME: Edmundo Lee - ROOM: [address] [place] [address]- - MRN: [id number] - Age: 66year old - :  1945    Date of Admission: 10/8/2019  3:31 PM  Admission Diagnosis: COPD exacerbation ([facility] [J44.1]  Reason for consult: COPD exacerbation 1 Inhaler Rfl: 1 10/8/2019 at 0900   Brinzolamide-Brimonidine 1-0.2 % Ophthalmic Suspension 1 drop. Disp:  Rfl:  10/7/2019 at 2200   Multiple Vitamins-Minerals (ALIVE MENS ENERGY) Oral Tab Take 1 tablet by mouth daily.  Disp:  Rfl:  10/7/2019 at Unknown pia Narrative      Not on file         Family History:  Family History   Problem Relation Age of Onset   • Heart Disease Father    • Cancer Mother         Home Medications:    Outpatient Medications Marked as Taking for the 10/8/19 encounter [facility] 107/44  100/65    BP Location: Left arm  Left arm    Pulse: 113  73 74   Resp: 20  19 18   Temp: 98 °F (36.7 °C)  97.5 °F (36.4 °C)    TempSrc: Oral  Oral    SpO2: 92% 94% 93% 93%   Weight: 155 lb 8 oz (70.5 kg)  160 lb 9.6 oz (72.8 kg)    Height: and reflexes       throughout         Recent Labs     10/08/19  1544 10/09/19  0623   WBC 16.6* 12.1*   HGB 13.8 12.1*   MCV 87.6 87.5   .0 175.0       Recent Labs     10/08/19  1544 10/09/19  0623   * 130*   K 4.2 4.3   CL 91* 96*   CO2 26.0

## 2019-10-09 NOTE — ED NOTES
Assumed care, report received from [place], [facility]. Pt sitting in bed, eating dinner. Neb tx in progress. Pt states breathing better, \"this treatment helps. \" He denies pain.  Resps shallow, regular

## 2019-10-09 NOTE — PAYOR COMM NOTE
--------------  ADMISSION REVIEW     Payor: [address] [place] [address] Road #:  [id number]  Authorization Number: [id number]     ED Provider Notes      Patient Seen in: [facility] Emergency Department      History   Patient presents with: (36.9 °C) (Oral)   Resp 22   Ht 182.9 cm (6')   Wt 72.6 kg   SpO2 95%   BMI 21.70 kg/m²          Physical Exam    HEENT : NCAT, EOMI, PEERL, throat clear, neck supple, no JVD, trachea midline, No LAD  Heart: S1S2 normal. No murmurs, regular rate and rhythm Report. Rate: 90  Rhythm: Sinus Rhythm  Reading: agreed              Xr Chest Ap Portable  (cpt=71045)    Result Date: 10/8/2019  PROCEDURE:  XR CHEST AP PORTABLE  (CPT=71045)  TECHNIQUE:  AP chest radiograph was obtained.   COMPARISON:  ASIM REED is stated in HPI. OBJECTIVE:  /65 (BP Location: Left arm)   Pulse 74   Temp 97.5 °F (36.4 °C) (Oral)   Resp 18   Ht 182.9 cm (6')   Wt 160 lb 9.6 oz (72.8 kg)   SpO2 93%   BMI 21.78 kg/m²    General:  Alert, no distress, appears stated age.     H this time    PUD  - ppi    Hyponatremia  - from lack of po   - IVF, follow    Anemia  - chronic,  Needs OP fu        lovenox        Outpatient records or previous hospital records reviewed.    [facility] hospitalist to continue to follow patient while in house  A Latest Ref Range: 22.0 - 26.0 mEq/L 27.3 (H)   ABG O2 SATURATION Latest Ref Range: 92 - 100 % 87 (L)   CALCULATED O2 SAT Latest Ref Range: 92 - 100 % 90 (L)   ABG BASE EXCESS Unknown 2.5   TOTAL HEMOGLOBIN Latest Ref Range: 12.6 - 17.4 g/dL 8.7 (L)   METHE

## 2019-10-09 NOTE — PROGRESS NOTES
Spo2% on room air at rest: 87%    Spo2 ambulation on room air: 86%    Spo2 ambulation on o2:       89%       On: 4   Liters per minute

## 2019-10-09 NOTE — PROGRESS NOTES
[facility] Hospitalist Progress Note                                                                   [address] Lee  9/18/1945    SUBJECTIVE:  Feeling a bit better, though NC increased to 4L. pulm- will consult here     tobacco use  - > 3 min taken discussing importance of cessation and methods to help do so  - he is not interested in quitting at this time     PUD  - ppi    Diarrhea  - r/o cdif     Hyponatremia  - from lack of po   - imrproving

## 2019-10-09 NOTE — CM/SW NOTE
STEVEN paged [address] care with referral.  Liaison will meet with the patient/familyto provide choice, explanation of services, and financial disclosure.     Residential home healthcare referral pending as of 3:44pm

## 2019-10-09 NOTE — RESPIRATORY THERAPY NOTE
Pt on 3 lpm nc. Sats 92%. BS diminshed clear. Scheduled neb tx tolerating well.  Will continue to monitor

## 2019-10-10 PROCEDURE — 80048 BASIC METABOLIC PNL TOTAL CA: CPT | Performed by: INTERNAL MEDICINE

## 2019-10-10 PROCEDURE — 94640 AIRWAY INHALATION TREATMENT: CPT

## 2019-10-10 PROCEDURE — 87493 C DIFF AMPLIFIED PROBE: CPT | Performed by: INTERNAL MEDICINE

## 2019-10-10 PROCEDURE — 85025 COMPLETE CBC W/AUTO DIFF WBC: CPT | Performed by: INTERNAL MEDICINE

## 2019-10-10 NOTE — CM/SW NOTE
[facility] liaison has met with pt who has declined [facility] upon discharge. / to remain available for support and/or discharge planning.

## 2019-10-10 NOTE — HOME CARE LIAISON
Received referral from Monae David with patient at the bedside. He is currently declining home health services at this time. BODØ SW updated.

## 2019-10-10 NOTE — PROGRESS NOTES
Rosalia Gema Lee Patient Status:  Inpatient    1945 MRN [id number]   [facility] 5NW-A Attending Ramses West MD   Hosp Day # 2 PCP Michelet Dickens MD     Pulm / Critical Care Progress Note     S: required 8-10L non-tender, non-distended, positive BS.    Extremity: no edema       Recent Labs   Lab 10/08/19  1544 10/09/19  0623 10/10/19  0913   WBC 16.6* 12.1* 20.5*   HGB 13.8 12.1* 11.2*   HCT 39.7 35.0* 33.2*   .0 175.0 192.0     No results for input(s): IN

## 2019-10-10 NOTE — DIETARY NOTE
[facility]     Admitting diagnosis:  COPD exacerbation ([facility] [J44.1]    Ht: 182.9 cm (6')  Wt: 75.6 kg (166 lb 9.6 oz). This is 93% of IBW  Body mass index is 22.6 kg/m².   IBW: 80.9 kg    Wt Readings from Last E

## 2019-10-10 NOTE — PROGRESS NOTES
[facility] Hospitalist Progress Note                                                                   [facility] KEENAN Lee  9/18/1945    CC: FU SOB    Interval History:  - Feels about the same today- s 12.1* 11.2*   HCT 39.7 35.0* 33.2*   MCV 87.6 87.5 89.2   MCH 30.5 30.3 30.1   MCHC 34.8 34.6 33.7   RDW 12.6 12.5 12.6   NEPRELIM 13.77* 11.47* 18.94*   WBC 16.6* 12.1* 20.5*   .0 175.0 192.0       Recent Labs   Lab 10/08/19  1544 10/09/19  0623 10

## 2019-10-10 NOTE — PLAN OF CARE
Problem: COPD exacerbation  Data: Patient alert and oriented overnight, on 2L O2 per NC maintaining saturation >90%, some shortness of breath with exertion noted. Patient up in room as tolerated, voiding freely, denies pain, vital signs stable.    Action: D

## 2019-10-10 NOTE — PROGRESS NOTES
Problem:  COPD    Data:  Alert and oriented. Glasses. Dentures. 2Lnc at rest. Room air is baseline. Failed oxygen walk today. Nebs. . IV steroids. Refusing SCD's and Lovenox. Voids. Up stand by. IVF. PO abx.     Action: Keep patient comfortable and russell

## 2019-10-10 NOTE — PROGRESS NOTES
SPO2% ON ROOM AIR AT REST: 88%    SPO2% AMBULATION ON ROOM AIR: 81%    SPO2% AMBULATION ON O2: 96% ON 8-10 LITERS PER MINUTE

## 2019-10-11 VITALS
DIASTOLIC BLOOD PRESSURE: 68 MMHG | OXYGEN SATURATION: 91 % | BODY MASS INDEX: 22.57 KG/M2 | TEMPERATURE: 98 F | HEART RATE: 76 BPM | RESPIRATION RATE: 18 BRPM | WEIGHT: 166.63 LBS | HEIGHT: 72 IN | SYSTOLIC BLOOD PRESSURE: 123 MMHG

## 2019-10-11 PROCEDURE — 94640 AIRWAY INHALATION TREATMENT: CPT

## 2019-10-11 RX ORDER — PREDNISONE 20 MG/1
TABLET ORAL
Qty: 12 TABLET | Refills: 0 | Status: SHIPPED | OUTPATIENT
Start: 2019-10-11 | End: 2020-09-14 | Stop reason: ALTCHOICE

## 2019-10-11 RX ORDER — METHYLPREDNISOLONE SODIUM SUCCINATE 125 MG/2ML
60 INJECTION, POWDER, LYOPHILIZED, FOR SOLUTION INTRAMUSCULAR; INTRAVENOUS EVERY 6 HOURS
Status: COMPLETED | OUTPATIENT
Start: 2019-10-11 | End: 2019-10-11

## 2019-10-11 RX ORDER — PREDNISONE 20 MG/1
40 TABLET ORAL
Status: DISCONTINUED | OUTPATIENT
Start: 2019-10-12 | End: 2019-10-11

## 2019-10-11 RX ORDER — AZITHROMYCIN 250 MG/1
250 TABLET, FILM COATED ORAL
Qty: 14 TABLET | Refills: 1 | Status: SHIPPED | OUTPATIENT
Start: 2019-10-11 | End: 2019-11-26 | Stop reason: ALTCHOICE

## 2019-10-11 RX ORDER — IPRATROPIUM BROMIDE AND ALBUTEROL SULFATE 2.5; .5 MG/3ML; MG/3ML
3 SOLUTION RESPIRATORY (INHALATION)
Status: DISCONTINUED | OUTPATIENT
Start: 2019-10-11 | End: 2019-10-11

## 2019-10-11 NOTE — PAYOR COMM NOTE
--------------  CONTINUED STAY REVIEW    Payor: [facility] CHOICE PLUS  Subscriber #:  [id number]  Authorization Number: [id number]          MEDICATIONS ADMINISTERED IN LAST 1 DAY:  azithromycin (ZITHROMAX) tab 250 mg     Date Action Dose Route Us COPD exacerbation  -has been weaned to ra, ambulating with out O2 needs.    1. COPD exacerbation  -due to inability to afford meds and continued smoking  -cont IV steroids to PO  -trial of anoro- advised pt to look online for coupons upon discharge  -BD pro

## 2019-10-11 NOTE — PAYOR COMM NOTE
--------------  CONTINUED STAY REVIEW    Payor: [facility] CHOICE PLUS  Subscriber #:  [id number]  Authorization Number: [id number]          MEDICATIONS ADMINISTERED IN LAST 1 DAY:  ipratropium-albuterol (DUONEB) nebulizer solution 3 mL     Date A opt  3. Tob abuse  -smoking cessation advised  -nicotine patch declined      not ready for d/c                10/10 HOSP     aecopd  - no evidence of pna on imaging, procal neg                 - hold further abx except azithro per pulm  - cont steroids, BD

## 2019-10-11 NOTE — PLAN OF CARE
Problem: Patient/Family Goals  Goal: Patient/Family Long Term Goal  Description  Patient's Long Term Goal:   D/c with appropriate resources    Interventions:  - comply with POC  - See additional Care Plan goals for specific interventions   10/11/2019 154

## 2019-10-11 NOTE — PROGRESS NOTES
Shelby Gema Lee Patient Status:  Inpatient    1945 MRN [id number]   [facility] 5NW-A Attending Tee Hernandez MD   Hosp Day # 3 PCP Love Reyes MD     Pulm / Critical Care Progress Note     S: Pt states that Recent Labs   Lab 10/08/19  1544 10/09/19  0623 10/10/19  0913   WBC 16.6* 12.1* 20.5*   HGB 13.8 12.1* 11.2*   HCT 39.7 35.0* 33.2*   .0 175.0 192.0     No results for input(s): INR in the last 168 hours.       Recent Labs   Lab 10/08/19  1544 10/

## 2019-10-11 NOTE — PLAN OF CARE
PROBLEM:COPD  DATA:PT. A/O X4. VSS. DENIES PAIN. WEANED PT. OFF O2 THIS AM AND O2 SATS MAINTAINED 92% AND GREATER ON RA. O2 WALK DONE THIS AM AND PT. TOLERATED FAIRLY WELL. MAINTAINED O2 SATS >92% ON RA WITH AMBULATION IN HALLWAY.  ELLA LUNGS CLEAR/DIMINISHE jean three times today, discharge home soon  Interventions:   - wean oxygen down as tolerated  -cluster care as able  - monitor O2 levels  - See additional Care Plan goals for specific interventions       Outcome: Progressing

## 2019-10-11 NOTE — PLAN OF CARE
Problem: Patient/Family Goals  Goal: Patient/Family Short Term Goal  Description  Patient's Short Term Goal:   10/8 noc: wean O2 back to baseline RA  10/9: Wean O2  10/9 noc: get some sleep overnight  10/10/nocs: wean off oxygen  Interventions:   - wean

## 2019-10-11 NOTE — PROGRESS NOTES
NURSING DISCHARGE NOTE    Discharged Home via Ambulatory. Accompanied by Family member  Belongings Taken by patient/family. All discharge instructions given to pt. Pt. Verbalized understanding. Prescriptions sent to pt's. Pharmacy. Pt's.  Wife called

## 2019-10-12 NOTE — DISCHARGE SUMMARY
General Medicine Discharge Summary     Patient ID:  Bao Lee  66year old  9/18/1945    Admit date: 10/8/2019    Discharge date and time:  10/11/19    Attending Physician: No att. providers found WORK    Operative Procedures:        Patient instructions:      Discharge Medication List as of 10/11/2019  3:48 PM    START taking these medications    azithromycin 250 MG Oral Tab  Take 1 tablet (250 mg total) by mouth 3 (three) times a week., Normal, Elenore Donate

## 2020-01-13 RX ORDER — UMECLIDINIUM BROMIDE AND VILANTEROL TRIFENATATE 62.5; 25 UG/1; UG/1
POWDER RESPIRATORY (INHALATION)
Qty: 3 EACH | Refills: 2 | Status: SHIPPED | OUTPATIENT
Start: 2020-01-13 | End: 2020-11-09

## 2020-09-14 ENCOUNTER — HOSPITAL ENCOUNTER (INPATIENT)
Facility: HOSPITAL | Age: 75
LOS: 1 days | Discharge: HOME | DRG: 066 | End: 2020-09-15
Attending: EMERGENCY MEDICINE | Admitting: HOSPITALIST
Payer: COMMERCIAL

## 2020-09-14 ENCOUNTER — APPOINTMENT (OUTPATIENT)
Dept: MRI IMAGING | Facility: HOSPITAL | Age: 75
DRG: 066 | End: 2020-09-14
Attending: EMERGENCY MEDICINE
Payer: COMMERCIAL

## 2020-09-14 ENCOUNTER — APPOINTMENT (OUTPATIENT)
Dept: CT IMAGING | Facility: HOSPITAL | Age: 75
DRG: 066 | End: 2020-09-14
Attending: INTERNAL MEDICINE
Payer: COMMERCIAL

## 2020-09-14 DIAGNOSIS — R42 DIZZINESS: ICD-10-CM

## 2020-09-14 DIAGNOSIS — S06.5X9A ACUTE SUBDURAL HEMATOMA (HCC): Primary | ICD-10-CM

## 2020-09-14 PROBLEM — E87.1 HYPONATREMIA: Status: ACTIVE | Noted: 2020-09-14

## 2020-09-14 PROBLEM — S06.5XAA ACUTE SUBDURAL HEMATOMA (HCC): Status: ACTIVE | Noted: 2020-09-14

## 2020-09-14 PROBLEM — D69.6 THROMBOCYTOPENIA (HCC): Status: ACTIVE | Noted: 2020-09-14

## 2020-09-14 LAB
ALBUMIN SERPL-MCNC: 3.7 G/DL (ref 3.4–5)
ALBUMIN/GLOB SERPL: 1.2 {RATIO} (ref 1–2)
ALP LIVER SERPL-CCNC: 125 U/L (ref 45–117)
ALT SERPL-CCNC: 18 U/L (ref 16–61)
ANION GAP SERPL CALC-SCNC: 2 MMOL/L (ref 0–18)
APTT PPP: 30.8 SECONDS (ref 25.4–36.1)
AST SERPL-CCNC: 16 U/L (ref 15–37)
ATRIAL RATE: 64 BPM
BASOPHILS # BLD AUTO: 0.05 X10(3) UL (ref 0–0.2)
BASOPHILS NFR BLD AUTO: 0.6 %
BILIRUB SERPL-MCNC: 0.9 MG/DL (ref 0.1–2)
BUN BLD-MCNC: 15 MG/DL (ref 7–18)
BUN/CREAT SERPL: 14.6 (ref 10–20)
CALCIUM BLD-MCNC: 8.9 MG/DL (ref 8.5–10.1)
CHLORIDE SERPL-SCNC: 103 MMOL/L (ref 98–112)
CO2 SERPL-SCNC: 30 MMOL/L (ref 21–32)
CREAT BLD-MCNC: 1.03 MG/DL (ref 0.7–1.3)
DEPRECATED RDW RBC AUTO: 44.1 FL (ref 35.1–46.3)
EOSINOPHIL # BLD AUTO: 0.09 X10(3) UL (ref 0–0.7)
EOSINOPHIL NFR BLD AUTO: 1.2 %
ERYTHROCYTE [DISTWIDTH] IN BLOOD BY AUTOMATED COUNT: 13.2 % (ref 11–15)
GLOBULIN PLAS-MCNC: 3.1 G/DL (ref 2.8–4.4)
GLUCOSE BLD-MCNC: 90 MG/DL (ref 70–99)
HCT VFR BLD AUTO: 45 % (ref 39–53)
HGB BLD-MCNC: 14.7 G/DL (ref 13–17.5)
IMM GRANULOCYTES # BLD AUTO: 0.02 X10(3) UL (ref 0–1)
IMM GRANULOCYTES NFR BLD: 0.3 %
INR BLD: 1.02 (ref 0.86–1.11)
LYMPHOCYTES # BLD AUTO: 2.2 X10(3) UL (ref 1–4)
LYMPHOCYTES NFR BLD AUTO: 28.4 %
M PROTEIN MFR SERPL ELPH: 6.8 G/DL (ref 6.4–8.2)
MCH RBC QN AUTO: 29.8 PG (ref 26–34)
MCHC RBC AUTO-ENTMCNC: 32.7 G/DL (ref 31–37)
MCV RBC AUTO: 91.3 FL (ref 80–100)
MONOCYTES # BLD AUTO: 0.72 X10(3) UL (ref 0.1–1)
MONOCYTES NFR BLD AUTO: 9.3 %
NEUTROPHILS # BLD AUTO: 4.68 X10 (3) UL (ref 1.5–7.7)
NEUTROPHILS # BLD AUTO: 4.68 X10(3) UL (ref 1.5–7.7)
NEUTROPHILS NFR BLD AUTO: 60.2 %
OSMOLALITY SERPL CALC.SUM OF ELEC: 280 MOSM/KG (ref 275–295)
P AXIS: 68 DEGREES
P-R INTERVAL: 178 MS
PLATELET # BLD AUTO: 142 10(3)UL (ref 150–450)
POTASSIUM SERPL-SCNC: 3.9 MMOL/L (ref 3.5–5.1)
PSA SERPL DL<=0.01 NG/ML-MCNC: 13.6 SECONDS (ref 12.1–14.7)
Q-T INTERVAL: 416 MS
QRS DURATION: 86 MS
QTC CALCULATION (BEZET): 429 MS
R AXIS: -58 DEGREES
RBC # BLD AUTO: 4.93 X10(6)UL (ref 3.8–5.8)
SODIUM SERPL-SCNC: 135 MMOL/L (ref 136–145)
T AXIS: 62 DEGREES
TROPONIN I SERPL-MCNC: <0.045 NG/ML (ref ?–0.04)
VENTRICULAR RATE: 64 BPM
WBC # BLD AUTO: 7.8 X10(3) UL (ref 4–11)

## 2020-09-14 PROCEDURE — 70553 MRI BRAIN STEM W/O & W/DYE: CPT | Performed by: EMERGENCY MEDICINE

## 2020-09-14 PROCEDURE — 99291 CRITICAL CARE FIRST HOUR: CPT | Performed by: INTERNAL MEDICINE

## 2020-09-14 PROCEDURE — 99222 1ST HOSP IP/OBS MODERATE 55: CPT | Performed by: NURSE PRACTITIONER

## 2020-09-14 PROCEDURE — 70450 CT HEAD/BRAIN W/O DYE: CPT | Performed by: INTERNAL MEDICINE

## 2020-09-14 RX ORDER — ACETAMINOPHEN 650 MG/1
650 SUPPOSITORY RECTAL EVERY 4 HOURS PRN
Status: DISCONTINUED | OUTPATIENT
Start: 2020-09-14 | End: 2020-09-15

## 2020-09-14 RX ORDER — MECLIZINE HYDROCHLORIDE 25 MG/1
25 TABLET ORAL ONCE
Status: COMPLETED | OUTPATIENT
Start: 2020-09-14 | End: 2020-09-14

## 2020-09-14 RX ORDER — ONDANSETRON 2 MG/ML
4 INJECTION INTRAMUSCULAR; INTRAVENOUS EVERY 6 HOURS PRN
Status: DISCONTINUED | OUTPATIENT
Start: 2020-09-14 | End: 2020-09-15

## 2020-09-14 RX ORDER — ACETAMINOPHEN 325 MG/1
650 TABLET ORAL EVERY 4 HOURS PRN
Status: DISCONTINUED | OUTPATIENT
Start: 2020-09-14 | End: 2020-09-15

## 2020-09-14 RX ORDER — NICOTINE 21 MG/24HR
1 PATCH, TRANSDERMAL 24 HOURS TRANSDERMAL DAILY
Status: DISCONTINUED | OUTPATIENT
Start: 2020-09-14 | End: 2020-09-15

## 2020-09-14 RX ORDER — SODIUM CHLORIDE 9 MG/ML
INJECTION, SOLUTION INTRAVENOUS CONTINUOUS
Status: ACTIVE | OUTPATIENT
Start: 2020-09-14 | End: 2020-09-14

## 2020-09-14 RX ORDER — ALBUTEROL SULFATE 90 UG/1
2 AEROSOL, METERED RESPIRATORY (INHALATION) EVERY 6 HOURS PRN
Status: DISCONTINUED | OUTPATIENT
Start: 2020-09-14 | End: 2020-09-15

## 2020-09-14 RX ORDER — SODIUM CHLORIDE 9 MG/ML
INJECTION, SOLUTION INTRAVENOUS ONCE
Status: COMPLETED | OUTPATIENT
Start: 2020-09-14 | End: 2020-09-14

## 2020-09-14 RX ORDER — SODIUM CHLORIDE 9 MG/ML
INJECTION, SOLUTION INTRAVENOUS CONTINUOUS
Status: DISCONTINUED | OUTPATIENT
Start: 2020-09-14 | End: 2020-09-14

## 2020-09-14 RX ORDER — DOCUSATE SODIUM 100 MG/1
100 CAPSULE, LIQUID FILLED ORAL 2 TIMES DAILY PRN
Status: DISCONTINUED | OUTPATIENT
Start: 2020-09-14 | End: 2020-09-15

## 2020-09-14 RX ORDER — LORAZEPAM 2 MG/ML
1 INJECTION INTRAMUSCULAR
Status: DISCONTINUED | OUTPATIENT
Start: 2020-09-14 | End: 2020-09-15

## 2020-09-14 RX ORDER — HYDRALAZINE HYDROCHLORIDE 20 MG/ML
10 INJECTION INTRAMUSCULAR; INTRAVENOUS EVERY 2 HOUR PRN
Status: DISCONTINUED | OUTPATIENT
Start: 2020-09-14 | End: 2020-09-15

## 2020-09-14 RX ORDER — METOCLOPRAMIDE HYDROCHLORIDE 5 MG/ML
10 INJECTION INTRAMUSCULAR; INTRAVENOUS EVERY 8 HOURS PRN
Status: DISCONTINUED | OUTPATIENT
Start: 2020-09-14 | End: 2020-09-15

## 2020-09-14 RX ORDER — ONDANSETRON 2 MG/ML
4 INJECTION INTRAMUSCULAR; INTRAVENOUS ONCE
Status: COMPLETED | OUTPATIENT
Start: 2020-09-14 | End: 2020-09-14

## 2020-09-14 RX ORDER — MECLIZINE HYDROCHLORIDE 25 MG/1
25 TABLET ORAL 3 TIMES DAILY PRN
Status: DISCONTINUED | OUTPATIENT
Start: 2020-09-14 | End: 2020-09-15

## 2020-09-14 RX ORDER — DIPHENHYDRAMINE HYDROCHLORIDE 50 MG/ML
25 INJECTION INTRAMUSCULAR; INTRAVENOUS ONCE
Status: COMPLETED | OUTPATIENT
Start: 2020-09-14 | End: 2020-09-14

## 2020-09-14 RX ORDER — LABETALOL HYDROCHLORIDE 5 MG/ML
10 INJECTION, SOLUTION INTRAVENOUS EVERY 10 MIN PRN
Status: DISCONTINUED | OUTPATIENT
Start: 2020-09-14 | End: 2020-09-15

## 2020-09-14 RX ORDER — ONDANSETRON 2 MG/ML
4 INJECTION INTRAMUSCULAR; INTRAVENOUS EVERY 4 HOURS PRN
Status: DISCONTINUED | OUTPATIENT
Start: 2020-09-14 | End: 2020-09-14 | Stop reason: SDUPTHER

## 2020-09-14 NOTE — PLAN OF CARE
Pt. From e.d., pt. Dizzy at times, no complaints of pain, denies falling, vital stable, head ct completed.

## 2020-09-14 NOTE — H&P
[facility] Hospitalist H&P       CC: Patient presents with:  Dizziness       PCP: Alexandr Felix MD    History of Present Illness: Patient is a 66year old male with PMH sig for COPD, current 1ppd smoker & PUD is admitted with complaint of vertigo found to have (six) hours as needed for Wheezing or Shortness of Breath., Disp: 1 Inhaler, Rfl: 11  Tafluprost (ZIOPTAN) 0.0015 % Ophthalmic Solution, Place 1 drop into both eyes nightly.  , Disp: , Rfl:   Multiple Vitamins-Minerals (ALIVE MENS ENERGY) Oral Tab, Take 1 Neurologic: Normal strength, no focal deficit appreciated     Diagnostic Data:    CBC/Chem  Recent Labs   Lab 09/14/20  0849   WBC 7.8   HGB 14.7   MCV 91.3   .0*   INR 1.02       Recent Labs   Lab 09/14/20  0849   *   K 3.9      CO2 3 There is no restricted diffusion to suggest acute ischemia/infarction. Findings concerning for acute right maxillary sinusitis. Clinical correlation recommended. Minimal fluid in the right mastoid air cells.   Minimal ethmoid and left maxillary mucosal t

## 2020-09-14 NOTE — ED INITIAL ASSESSMENT (HPI)
Pt aox4. Pt presents to ed from home per [facility]. Pt c/o dizziness worse with movement started this am @ 0600 am. Pt c/o nausea. Pt states has unsteady gait and increased dizziness with movement.

## 2020-09-14 NOTE — CONSULTS
[facility]  Neurocritical Care Consult Note    Bessy Joelle Jesus Patient Status:  Emergency    1945 MRN [id number]   Location [facility] Attending Natali Maradiaga MD   Hosp Day # 0 PCP Femi Nuñez MD Packs/day: 1.00        Years: 60.00        Pack years: 61        Types: Cigarettes        Start date: 1/26/1957      Smokeless tobacco: Never Used      Tobacco comment: was smoking 1ppd or more for >50 years    Substance and Sexual Activity      Alcohol us significant midline shift. 2. Minimal chronic microvascular ischemic changes in the cerebral white matter. 3. Findings concerning for acute right maxillary sinusitis. Clinical correlation recommended.   Critical results were discussed with Dr. Jamarcus Baca at

## 2020-09-14 NOTE — ED PROVIDER NOTES
Patient Seen in: [facility] Emergency Department      History   Patient presents with:  Dizziness    Stated Complaint: dizziness with movement. Unsteady gait with movement.  spinning with movement an*    HPI    Patient is a pleasant 49-year-old male complaint: dizziness with movement. Unsteady gait with movement. spinning with movement an*  Other systems are as noted in HPI. Constitutional and vital signs reviewed. All other systems reviewed and negative except as noted above.     Physical Exam PROTHROMBIN TIME (PT) - Normal   PTT, ACTIVATED - Normal   CBC WITH DIFFERENTIAL WITH PLATELET    Narrative: The following orders were created for panel order CBC WITH DIFFERENTIAL WITH PLATELET.   Procedure                               Abnormality 1.2 cm in thickness. There is mild local mass effect on the adjacent brain parenchyma. Gradient susceptibility is seen associated with the collection. Probable arachnoid cyst in the left middle cranial fossa anteriorly.   Minimal chronic microvascular is the [address]. Oren Velez notified. Dr. Musa Cifuentes to follow in neurology critical care. Dr. Scarlet Andino to follow for neurosurgery.     Repeat imaging and additional evaluation/treatment will be facilitated as needed, based on the patient'

## 2020-09-14 NOTE — CONSULTS
[facility]  Neurosurgery Consult    Aayush Lee Patient Status:  Emergency    1945 MRN [id number]   Location [facility] Attending Christian Crenshaw MD   Hosp Day # 0 PCP Janae Trinidad MD     REASON FOR CONSULTATION: a 60.00 pack-year smoking history. He has never used smokeless tobacco. He reports that he does not drink alcohol or use drugs.     ALLERGIES:    Brimonidine             OTHER (SEE COMMENTS)    Comment:Brow abscess and periorbital dermatitis  Latanoprost Results   Component Value Date    WBC 7.8 09/14/2020    HGB 14.7 09/14/2020    HCT 45.0 09/14/2020    .0 09/14/2020    CREATSERUM 1.03 09/14/2020    BUN 15 09/14/2020     09/14/2020    K 3.9 09/14/2020     09/14/2020    CO2 30.0 09/14/20 hematoma along the left cerebral convexity measuring up to 3.6 cm in thickness. Mild local mass effect without significant midline shift. 2. Minimal chronic microvascular ischemic changes in the cerebral white matter.      3. Findings concerning for ac

## 2020-09-14 NOTE — ED NOTES
Pt has a skin tear noted to rt midarm. Rn removed bandage. Site clean, dry, and pink. No warmth or redness noted. Rn changed dressing and placed a nonadherent dressing to site.

## 2020-09-15 VITALS
TEMPERATURE: 97 F | SYSTOLIC BLOOD PRESSURE: 121 MMHG | DIASTOLIC BLOOD PRESSURE: 55 MMHG | HEIGHT: 72 IN | HEART RATE: 84 BPM | OXYGEN SATURATION: 98 % | BODY MASS INDEX: 22.58 KG/M2 | RESPIRATION RATE: 15 BRPM | WEIGHT: 166.69 LBS

## 2020-09-15 LAB
ANION GAP SERPL CALC-SCNC: 3 MMOL/L (ref 0–18)
BUN BLD-MCNC: 12 MG/DL (ref 7–18)
BUN/CREAT SERPL: 12.9 (ref 10–20)
CALCIUM BLD-MCNC: 8.4 MG/DL (ref 8.5–10.1)
CHLORIDE SERPL-SCNC: 105 MMOL/L (ref 98–112)
CO2 SERPL-SCNC: 28 MMOL/L (ref 21–32)
CREAT BLD-MCNC: 0.93 MG/DL (ref 0.7–1.3)
DEPRECATED RDW RBC AUTO: 45.1 FL (ref 35.1–46.3)
ERYTHROCYTE [DISTWIDTH] IN BLOOD BY AUTOMATED COUNT: 13.2 % (ref 11–15)
GLUCOSE BLD-MCNC: 80 MG/DL (ref 70–99)
HCT VFR BLD AUTO: 42.8 % (ref 39–53)
HGB BLD-MCNC: 13.8 G/DL (ref 13–17.5)
MCH RBC QN AUTO: 30.1 PG (ref 26–34)
MCHC RBC AUTO-ENTMCNC: 32.2 G/DL (ref 31–37)
MCV RBC AUTO: 93.4 FL (ref 80–100)
OSMOLALITY SERPL CALC.SUM OF ELEC: 281 MOSM/KG (ref 275–295)
PLATELET # BLD AUTO: 126 10(3)UL (ref 150–450)
POTASSIUM SERPL-SCNC: 4.1 MMOL/L (ref 3.5–5.1)
RBC # BLD AUTO: 4.58 X10(6)UL (ref 3.8–5.8)
SARS-COV-2 RNA RESP QL NAA+PROBE: NOT DETECTED
SODIUM SERPL-SCNC: 136 MMOL/L (ref 136–145)
WBC # BLD AUTO: 7.6 X10(3) UL (ref 4–11)

## 2020-09-15 PROCEDURE — 99291 CRITICAL CARE FIRST HOUR: CPT | Performed by: INTERNAL MEDICINE

## 2020-09-15 NOTE — DISCHARGE SUMMARY
General Medicine Discharge Summary     Patient ID:  Verna Lee  66year old  9/18/1945    Admit date: 9/14/2020    Discharge date and time:9/15/2020    Attending Physician: Tee Hernandez MD Powder, Breath Activated  INHALE 1 PUFF INTO THE LUNGS DAILY    Albuterol Sulfate HFA (PROAIR HFA) 108 (90 Base) MCG/ACT Inhalation Aero Soln  Inhale 2 puffs into the lungs every 6 (six) hours as needed for Wheezing or Shortness of Breath.     Tafluprost (Z

## 2020-09-15 NOTE — PROGRESS NOTES
[facility]  Neurocritical Care Progress Note     Sheawiliam Pickettl Patient Status:  Inpatient    1945 MRN [id number]   [facility] 6NE-A Attending Amalia Foster MD   [facility] Day # 1 PCP MD Heavenly Carrion alert, regards and follows commands, oriented x3, speech fluent  · Cranial nerves- pupils equally round and reactive to light, extraocular muscles intact, face symmetric, visual fields full  · Motor- 5/5 throughout  · Sens-  Intact to light touch    Diagno glucose, sliding scale insulin prn  DVT Prophylaxis:  SCD’s    Goals of the Day: neurochecks   A total of 40 minutes of critical care time (exclusive of billable procedures) was administered to manage and/or prevent neurologic instability.  This involved di

## 2020-09-15 NOTE — PAYOR COMM NOTE
--------------  ADMISSION REVIEW     Payor: [address] [place] [address] Road #:  [id number]  Authorization Number: [id number]        ED Provider Notes        Patient Seen in: [facility] Emergency Department      History   Patient presents w systems reviewed and negative except as noted above.     Physical Exam     ED Triage Vitals   BP 09/14/20 0826 (!) 127/97   Pulse 09/14/20 0826 67   Resp 09/14/20 0826 16   Temp 09/14/20 0922 97.5 °F (36.4 °C)   Temp src 09/14/20 0922 Temporal   SpO2 09/14/ PLATELET.   Procedure                               Abnormality         Status                     ---------                               -----------         ------                     CBC W/ DIFFERENTIAL[[id number]]          Abnormal            Final resul middle cranial fossa anteriorly. Minimal chronic microvascular ischemic changes in the cerebral white matter. No abnormal parenchymal or leptomeningeal enhancement There is no restricted diffusion to suggest acute ischemia/infarction.   Findings concernin evaluation/treatment will be facilitated as needed, based on the patient's clinical course. Remainder of the patient's emergency room stay was without incident.       Disposition and Plan     Clinical Impression:  Acute subdural hematoma ([address]  (primary en auscultation bilaterally. Normal effort   Chest wall:  No tenderness or deformity. Heart:  Regular rate and rhythm, S1, S2 normal, no murmur, rub or gallop appreciated   Abdomen:   Soft, non-tender. Bowel sounds normal. No masses,  No organomegaly.  Non d    Primary Care Physician: Michelet Dickens MD      Discharge Diagnoses:   Vertigo  Nontraumatic SDH  Mild thrombocytopenia  Tobacco dependence     Primary Diagnosis at discharge from Hospital: Other: subdural; still recommend for TCM follow-up     Please n Breath.     Tafluprost (ZIOPTAN) 0.0015 % Ophthalmic Solution  Place 1 drop into both eyes nightly.       Multiple Vitamins-Minerals (ALIVE MENS ENERGY) Oral Tab  Take 1 tablet by mouth daily.              Activity: activity as tolerated  Diet: regular die

## 2020-09-15 NOTE — OCCUPATIONAL THERAPY NOTE
OCCUPATIONAL THERAPY QUICK EVALUATION - INPATIENT    Room Number: 4057/0924-I  Evaluation Date: 9/15/2020     Type of Evaluation: Quick Eval  Presenting Problem: L SDH    Physician Order: IP Consult to Occupational Therapy  Reason for Therapy:  ADL/IADL Dy Spouse    Toilet and Equipment: Standard height toilet          Occupation/Status: IT  Hand Dominance: Right  Drives: Yes       Prior Level of Function: lives with his wife. Independent with all ADL and IADL.  Works from home as .        OBJECT End of Session: Up in chair;Needs met;Call light within reach;RN aware of session/findings; All patient questions and concerns addressed    ASSESSMENT     Patient is a 66year old male admitted on 9/14/2020 for L SDH.  Complete medical history and occupation

## 2020-09-15 NOTE — PROGRESS NOTES
[facility]  Neurosurgery Progress Note    Lesia Lee Patient Status:  Inpatient    1945 MRN [id number]   [facility] 6NE-A Attending Jannet Lindsey, [address] [facility] Road Day # 1 PCP Annamarie Day MD     Chief Complaint:  OSIEL Snider regions. Maximal thickness is in the temporal region, measured at 1.9 cm. This is similar to the MRI study. There are areas calcified,   consistent with chronicity. A component of high attenuation acute to subacute blood is not excluded.   There is mild

## 2020-09-15 NOTE — SLP NOTE
ADULT SWALLOWING EVALUATION    ASSESSMENT    ASSESSMENT/OVERALL IMPRESSION:  Patient seen for swallowing evaluation per protocol. Patient admitted due to complaint of dizziness and unsteady gait. He was found to have left frontal and temporal SDH.   He is liquids  Precautions: Aspiration; Seizure    Patient/Family Goals: to get better    SWALLOWING HISTORY  Current Diet Consistency: Regular; Thin liquids  Dysphagia History: denied, aside from above noted dental issues  Imaging Results:   Brain CT from 9/14/20

## 2020-09-15 NOTE — PHYSICAL THERAPY NOTE
PHYSICAL THERAPY QUICK EVALUATION - INPATIENT    Room Number: 1589/4051-S  Evaluation Date: 9/15/2020  Presenting Problem: Acute SDH  Physician Order: PT Eval and Treat      Admitted with complaint of vertigo found to have SDH.  Was c/o suddenly severe ve of [place]: Ind in all aspect of mobilities s any use of an AD    SUBJECTIVE  I feel good and ready to go home    OBJECTIVE  Precautions: Seizure( to 150)  Fall Risk: Standard fall risk    WEIGHT BEARING RESTRICTION  Weight Bearing Restriction: amb s any use of an AD with good steady gt including stair navigation using reciprocal pattern. No dizziness, H/A or lightheadedness is observed.   Based on this evaluation, patient's clinical presentation is stable and overall evaluation complexity is cons

## 2020-09-15 NOTE — PLAN OF CARE
Received pt this am, Q1 neuros intact-see flow sheet for full assessment. VS stable, tolerating diet, denies pain, standby assist when ambulating to bathroom. Neurosurgery said okay to discharge, plan to follow up in 2 weeks.  Plan of care/discharge instruc

## 2020-09-15 NOTE — CM/SW NOTE
09/15/20 1200   CM/SW Screening   Referral Source    Information Source Chart review;Formerly [facility] staff   Patient's Mental Status Alert;Oriented   Patient lives with Spouse   Discharge Needs   Anticipated D/C needs To be determined     Patient was sc

## 2020-10-06 ENCOUNTER — TELEPHONE (OUTPATIENT)
Dept: SURGERY | Facility: CLINIC | Age: 75
End: 2020-10-06

## 2020-10-06 ENCOUNTER — OFFICE VISIT (OUTPATIENT)
Dept: NEUROLOGY | Facility: CLINIC | Age: 75
End: 2020-10-06
Payer: COMMERCIAL

## 2020-10-06 ENCOUNTER — NURSE ONLY (OUTPATIENT)
Dept: HEMATOLOGY/ONCOLOGY | Facility: HOSPITAL | Age: 75
End: 2020-10-06
Attending: NEUROLOGICAL SURGERY
Payer: COMMERCIAL

## 2020-10-06 VITALS
DIASTOLIC BLOOD PRESSURE: 90 MMHG | TEMPERATURE: 98 F | WEIGHT: 167 LBS | RESPIRATION RATE: 16 BRPM | SYSTOLIC BLOOD PRESSURE: 148 MMHG | OXYGEN SATURATION: 100 % | HEART RATE: 103 BPM | BODY MASS INDEX: 23 KG/M2

## 2020-10-06 DIAGNOSIS — R42 DIZZINESS: ICD-10-CM

## 2020-10-06 DIAGNOSIS — S06.5X9A ACUTE SUBDURAL HEMATOMA (HCC): Primary | ICD-10-CM

## 2020-10-06 DIAGNOSIS — S06.5X9A SUBDURAL HEMATOMA (HCC): Primary | ICD-10-CM

## 2020-10-06 PROCEDURE — 99211 OFF/OP EST MAY X REQ PHY/QHP: CPT

## 2020-10-06 PROCEDURE — 99213 OFFICE O/P EST LOW 20 MIN: CPT | Performed by: PHYSICIAN ASSISTANT

## 2020-10-06 NOTE — TELEPHONE ENCOUNTER
Will forward message on to PA team to start prior auth process. MRI brain order (w&wo) was placed by Ellen Donald on 9/15/2020. Patient was to completed MRI mid-October.  (Prior to appointment that was scheduled for today.)

## 2020-10-06 NOTE — TELEPHONE ENCOUNTER
rferral never dropped in PA pool to get authorization. It looks like when MRI was ordered on 9/15/20 the class was marked no referral. Can you please have provider reorder the MRI.  We need to see a referral.

## 2020-10-06 NOTE — PROGRESS NOTES
Neurosurgery Clinic Visit  10/6/2020    [address] PCP:  Debyb Mcdonough MD    6030 MRN [id number]           REASON FOR VISIT: Hospital follow up, SDH, possible meningioma, review MRI imaging with Neuro Conference      HISTORY OF PRESENT ILLNESS Disp: , Rfl:     •  Multiple Vitamins-Minerals (ALIVE MENS ENERGY) Oral Tab, Take 1 tablet by mouth daily. , Disp: , Rfl:     No current facility-administered medications on file prior to visit.      Past Medical History:   Diagnosis Date   • Cataract    • C dry.      DIAGNOSTIC DATA: None       IMAGING: No recent MRI imaging noted in Epic      ASSESSMENT and PLAN:  (S06.5X9A) Subdural hematoma (HCC)  (primary encounter diagnosis)    (R42) Dizziness        1. Medication: None prescribed  2.  Imaging:    - Revie

## 2020-10-06 NOTE — TELEPHONE ENCOUNTER
Please call below, provided by patient. States he discussed with insurance and they are unable to authorize MRI without the referral. I'm assuming they need to order?      Emory Monique   [phone]    Thank you

## 2020-10-06 NOTE — TELEPHONE ENCOUNTER
Ruven Mcburney [place], PA-C P Eni Naper 2 Front Office; Hoang Memos; [address] 2 Nurse             Can we please contact this patient? 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  He was advised in hospital to complete an M

## 2020-10-06 NOTE — TELEPHONE ENCOUNTER
See TE 10/6/2020. MRI needs Prior auth. Once MRI is authorized patient will be contacted to schedule imaging.

## 2020-10-11 ENCOUNTER — HOSPITAL ENCOUNTER (OUTPATIENT)
Dept: MRI IMAGING | Facility: HOSPITAL | Age: 75
Discharge: HOME | End: 2020-10-11
Attending: PHYSICIAN ASSISTANT
Payer: COMMERCIAL

## 2020-10-11 DIAGNOSIS — S06.5X9A ACUTE SUBDURAL HEMATOMA (HCC): ICD-10-CM

## 2020-10-11 DIAGNOSIS — R42 DIZZINESS: ICD-10-CM

## 2020-10-11 PROCEDURE — A9575 INJ GADOTERATE MEGLUMI 0.1ML: HCPCS | Performed by: PHYSICIAN ASSISTANT

## 2020-10-11 PROCEDURE — 70553 MRI BRAIN STEM W/O & W/DYE: CPT | Performed by: PHYSICIAN ASSISTANT

## 2021-02-02 DIAGNOSIS — Z23 NEED FOR VACCINATION: ICD-10-CM

## 2021-07-19 ENCOUNTER — HOSPITAL ENCOUNTER (OUTPATIENT)
Age: 76
Discharge: HOME | End: 2021-07-19
Attending: EMERGENCY MEDICINE
Payer: COMMERCIAL

## 2021-07-19 VITALS
SYSTOLIC BLOOD PRESSURE: 159 MMHG | HEART RATE: 88 BPM | OXYGEN SATURATION: 97 % | DIASTOLIC BLOOD PRESSURE: 76 MMHG | TEMPERATURE: 98 F | RESPIRATION RATE: 16 BRPM
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
## 2021-07-19 DIAGNOSIS — L25.9 CONTACT DERMATITIS, UNSPECIFIED CONTACT DERMATITIS TYPE, UNSPECIFIED TRIGGER: ICD-10-CM

## 2021-07-19 PROCEDURE — 99213 OFFICE O/P EST LOW 20 MIN: CPT

## 2021-07-19 RX ORDER — AMOXICILLIN AND CLAVULANATE POTASSIUM 875; 125 MG/1; MG/1
1 TABLET, FILM COATED ORAL 2 TIMES DAILY
Qty: 14 TABLET | Refills: 0 | Status: SHIPPED | OUTPATIENT
Start: 2021-07-19 | End: 2021-07-26

## 2021-07-19 NOTE — ED PROVIDER NOTES
Patient Seen in: [facility]      History   Patient presents with:  Animal Bite    Stated Complaint: cat bite to arm x 1 week    HPI/Subjective:   HPI    22-year-old male presents emergency room for evaluation of cat bite to the right fore systems reviewed and negative except as noted above.     Physical Exam     ED Triage Vitals [07/19/21 1805]   /76   Pulse 88   Resp 16   Temp 97.9 °F (36.6 °C)   Temp src Temporal   SpO2 97 %   O2 Device None (Room air)       Current:/76   Pulse 57747  [phone]    In 2 days            Medications Prescribed:  Current Discharge Medication List    START taking these medications    Amoxicillin-Pot Clavulanate 875-125 MG Oral Tab  Take 1 tablet by mouth 2 (two) times daily for 7 days.   Qty: 14 tab

## 2021-07-19 NOTE — ED INITIAL ASSESSMENT (HPI)
Right forearm - bite from own cat on the right forearm  1 week ago. Pt hs been applying neosporin and covered with bandaid. He states initially he just used a bandaid over the bite x 2 days he noted redness after applying the neosporin.  Denies fever

## 2021-07-22 ENCOUNTER — HOSPITAL ENCOUNTER (OUTPATIENT)
Age: 76
Discharge: HOME | End: 2021-07-22
Attending: EMERGENCY MEDICINE
Payer: COMMERCIAL

## 2021-07-22 VITALS
OXYGEN SATURATION: 98 % | DIASTOLIC BLOOD PRESSURE: 86 MMHG | RESPIRATION RATE: 16 BRPM | TEMPERATURE: 97 F | SYSTOLIC BLOOD PRESSURE: 126 MMHG | HEART RATE: 73 BPM
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
## 2021-07-22 PROCEDURE — 99212 OFFICE O/P EST SF 10 MIN: CPT

## 2021-07-22 NOTE — ED PROVIDER NOTES
Patient Seen in: [facility] [place]      History   Patient presents with:  Wound Recheck    Stated Complaint: wound check    HPI/Subjective:   HPI    Miladys Ackerman is a pleasant 75-year-old male presenting with wound check.   Patient was seen after a cat b Physical Exam    Right upper extremity exam shows pulse plus there is an area of erythema that was previously noted on the proximal right forearm over the extensor surface there is edema surrounding this but no erythema no streaking redness no pain

## 2021-12-02 ENCOUNTER — HOSPITAL ENCOUNTER (OUTPATIENT)
Dept: GENERAL RADIOLOGY | Age: 76
Discharge: HOME | End: 2021-12-02
Attending: NURSE PRACTITIONER
Payer: COMMERCIAL

## 2021-12-02 ENCOUNTER — OFFICE VISIT (OUTPATIENT)
Dept: ORTHOPEDICS CLINIC | Facility: CLINIC | Age: 76
End: 2021-12-02
Payer: COMMERCIAL

## 2021-12-02 ENCOUNTER — OFFICE VISIT (OUTPATIENT)
Dept: FAMILY MEDICINE CLINIC | Facility: CLINIC | Age: 76
End: 2021-12-02
Payer: COMMERCIAL

## 2021-12-02 VITALS
HEART RATE: 78 BPM | RESPIRATION RATE: 16 BRPM | BODY MASS INDEX: 22.35 KG/M2 | DIASTOLIC BLOOD PRESSURE: 70 MMHG | SYSTOLIC BLOOD PRESSURE: 122 MMHG | HEIGHT: 72 IN | OXYGEN SATURATION: 97 % | TEMPERATURE: 99 F | WEIGHT: 165 LBS

## 2021-12-02 DIAGNOSIS — S89.92XA INJURY OF LEFT KNEE, INITIAL ENCOUNTER: ICD-10-CM

## 2021-12-02 DIAGNOSIS — S89.92XA INJURY OF LEFT KNEE, INITIAL ENCOUNTER: Primary | ICD-10-CM

## 2021-12-02 DIAGNOSIS — S82.002A CLOSED NONDISPLACED FRACTURE OF LEFT PATELLA, UNSPECIFIED FRACTURE MORPHOLOGY, INITIAL ENCOUNTER: Primary | ICD-10-CM

## 2021-12-02 PROCEDURE — 3074F SYST BP LT 130 MM HG: CPT | Performed by: NURSE PRACTITIONER

## 2021-12-02 PROCEDURE — 3078F DIAST BP <80 MM HG: CPT | Performed by: NURSE PRACTITIONER

## 2021-12-02 PROCEDURE — 27520 TREAT KNEECAP FRACTURE: CPT | Performed by: ORTHOPAEDIC SURGERY

## 2021-12-02 PROCEDURE — 99212 OFFICE O/P EST SF 10 MIN: CPT | Performed by: NURSE PRACTITIONER

## 2021-12-02 PROCEDURE — 73562 X-RAY EXAM OF KNEE 3: CPT | Performed by: NURSE PRACTITIONER

## 2021-12-02 PROCEDURE — 99203 OFFICE O/P NEW LOW 30 MIN: CPT | Performed by: ORTHOPAEDIC SURGERY

## 2021-12-02 PROCEDURE — 3008F BODY MASS INDEX DOCD: CPT | Performed by: NURSE PRACTITIONER

## 2021-12-02 NOTE — PROGRESS NOTES
[facility] New Patient Note    CC: Patient presents with:  Knee Pain: Patient is here today due to having a fall yesterday in the afternoon and landed on his knee.  Patient states that it felt okay but was very swollen by night time and had a lo mouth daily.          Brimonidine             OTHER (SEE COMMENTS)    Comment:Brow abscess and periorbital dermatitis  Latanoprost             OTHER (SEE COMMENTS)    Comment:Brow abscess and periorbital dermatitis  Family History   Problem Relation Age of immobilization in full extension followed by gradual range of motion as his pain and swelling subsides. I recommend fitment into a hinged knee brace which will be locked in extension initially.   After 2 weeks physical therapy would be appropriate to [employer]

## 2021-12-02 NOTE — PROGRESS NOTES
Anibal Heredia  CHIEF COMPLAINT:     Patient presents with:  Knee Pain: left knee pain for 1 day. feel off 1 step at home. swelling at area      HPI:   Doy Lombard is a 64year old male who presents today with a chief complaint of  left knee pain.   Cause - hi Never used    Alcohol use: No    Drug use: No       REVIEW OF SYSTEMS:   GENERAL: Feels well otherwise. No fevers  SKIN: Denies rashes, skin wounds or ulcers.   LUNGS: denies shortness of breath   CARDIOVASCULAR: denies chest pain or palpitations  MUSCULOSK osteopenia is present.                 Referred patient to Dr. Stuart Cohn. Appointment made and patient went directly to Dr. Stuart Cohn office following xray. The patient indicates understanding of these issues and agrees to the plan.

## 2021-12-03 ENCOUNTER — TELEPHONE (OUTPATIENT)
Dept: ORTHOPEDICS CLINIC | Facility: CLINIC | Age: 76
End: 2021-12-03

## 2021-12-03 NOTE — TELEPHONE ENCOUNTER
Spoke with patient's granddaughter and notified her that pt should keep the brace on at all time to help with immobilization. Pt can take tylenol, ice, and elevate to help with pain management. She verbalized understanding.  No further questions at this pia

## 2021-12-03 NOTE — TELEPHONE ENCOUNTER
Patients granddaughter is requesting to know if patient needs to keep the knee brace on at all times.

## 2021-12-17 ENCOUNTER — TELEPHONE (OUTPATIENT)
Dept: PHYSICAL THERAPY | Facility: HOSPITAL | Age: 76
End: 2021-12-17

## 2021-12-22 ENCOUNTER — OFFICE VISIT (OUTPATIENT)
Dept: PHYSICAL THERAPY | Age: 76
End: 2021-12-22
Attending: ORTHOPAEDIC SURGERY
Payer: COMMERCIAL

## 2021-12-22 DIAGNOSIS — S82.002A CLOSED NONDISPLACED FRACTURE OF LEFT PATELLA, UNSPECIFIED FRACTURE MORPHOLOGY, INITIAL ENCOUNTER: ICD-10-CM

## 2021-12-22 PROCEDURE — 97161 PT EVAL LOW COMPLEX 20 MIN: CPT | Performed by: PHYSICAL THERAPIST

## 2021-12-22 PROCEDURE — 97110 THERAPEUTIC EXERCISES: CPT | Performed by: PHYSICAL THERAPIST

## 2021-12-22 PROCEDURE — 97140 MANUAL THERAPY 1/> REGIONS: CPT | Performed by: PHYSICAL THERAPIST

## 2021-12-22 NOTE — PROGRESS NOTES
LOWER EXTREMITY EVALUATION:   Referring Physician: Dr. Stuart Cohn  Diagnosis: Closed nondisplaced fracture of left patella, unspecified fracture morphology, initial encounter (R53.490K)     Date of Service: 12/22/2021     PATIENT SUMMARY   Derral Spurr is not limited to walking prolonged distances, squatting, bending the knee, yard work, kneeling, car transfers, lifting, carrying, pushing/pulling,  sit to stand transfers, don/doffing shoes, stooping, and stair negotiation.  Signs and symptoms are consistent evaluation, modalities as needed [ice/heat] and RICE, brace wear and management. .  Patient was instructed in and issued a HEP for: quad sets, AAROM heel slides, glut sets, long sitting gastroc stretch.    Manual Tech: edema massage and STM to distal quad an therapist: Theresa Perez PT  Physician's certification required: Yes  I certify the need for these services furnished under this plan of treatment and while under my care.     X___________________________________________________ Date____________________

## 2021-12-28 NOTE — PROGRESS NOTES
Dx: Closed nondisplaced fracture of left patella, unspecified fracture morphology, initial encounter (S82.002A)         Insurance (Authorized # of Visits):  Elise Boggs (N/A)           Authorizing Physician: Dr. Della Morales  Next MD visit: none scheduled  F PT to promote (L) knee A/PROM, soft tissue extensibility, joint mobility, strengthening, gait, body mechanics, and stability for return to PLOF.        Goals:   · Pt will improve knee AROM flexion to >130 degrees to improve ability to perform don/doff shoes

## 2021-12-29 ENCOUNTER — OFFICE VISIT (OUTPATIENT)
Dept: PHYSICAL THERAPY | Age: 76
End: 2021-12-29
Attending: ORTHOPAEDIC SURGERY
Payer: COMMERCIAL

## 2021-12-29 PROCEDURE — 97140 MANUAL THERAPY 1/> REGIONS: CPT | Performed by: PHYSICAL THERAPIST

## 2021-12-29 PROCEDURE — 97110 THERAPEUTIC EXERCISES: CPT | Performed by: PHYSICAL THERAPIST

## 2022-01-05 ENCOUNTER — HOSPITAL ENCOUNTER (OUTPATIENT)
Dept: GENERAL RADIOLOGY | Age: 77
Discharge: HOME | End: 2022-01-05
Attending: ORTHOPAEDIC SURGERY
Payer: COMMERCIAL

## 2022-01-05 ENCOUNTER — OFFICE VISIT (OUTPATIENT)
Dept: PHYSICAL THERAPY | Age: 77
End: 2022-01-05
Attending: ORTHOPAEDIC SURGERY
Payer: COMMERCIAL

## 2022-01-05 DIAGNOSIS — S82.002A CLOSED NONDISPLACED FRACTURE OF LEFT PATELLA, UNSPECIFIED FRACTURE MORPHOLOGY, INITIAL ENCOUNTER: ICD-10-CM

## 2022-01-05 PROCEDURE — 97110 THERAPEUTIC EXERCISES: CPT | Performed by: PHYSICAL THERAPIST

## 2022-01-05 PROCEDURE — 73560 X-RAY EXAM OF KNEE 1 OR 2: CPT | Performed by: ORTHOPAEDIC SURGERY

## 2022-01-05 PROCEDURE — 97140 MANUAL THERAPY 1/> REGIONS: CPT | Performed by: PHYSICAL THERAPIST

## 2022-01-05 NOTE — PROGRESS NOTES
Dx: Closed nondisplaced fracture of left patella, unspecified fracture morphology, initial encounter (S82.002A)         Insurance (Authorized # of Visits):  Jean-Pierre Armenta (N/A)           Authorizing Physician: Dr. Shoaib Bear  Next MD visit: none scheduled  F pad to improve safety with gait on uneven surfaces such as grass and gravel  · Pt will be independent and compliant with comprehensive HEP to maintain progress achieved in PT    Plan: progress strength to weight bearing  Date: 12/28/2021  TX#: 2/12 Date: 1

## 2022-01-07 ENCOUNTER — OFFICE VISIT (OUTPATIENT)
Dept: PHYSICAL THERAPY | Age: 77
End: 2022-01-07
Attending: ORTHOPAEDIC SURGERY
Payer: COMMERCIAL

## 2022-01-07 PROCEDURE — 97110 THERAPEUTIC EXERCISES: CPT | Performed by: PHYSICAL THERAPIST

## 2022-01-07 PROCEDURE — 97140 MANUAL THERAPY 1/> REGIONS: CPT | Performed by: PHYSICAL THERAPIST

## 2022-01-07 NOTE — PROGRESS NOTES
Dx: Closed nondisplaced fracture of left patella, unspecified fracture morphology, initial encounter (S82.002A)         Insurance (Authorized # of Visits):  Rachell Morejon (N/A)           Authorizing Physician: Dr. Carrie Fuller  Next MD visit: none scheduled  F safety with gait on uneven surfaces such as grass and gravel  · Pt will be independent and compliant with comprehensive HEP to maintain progress achieved in PT    Plan: progress strength to weight bearing as tolerated  Date: 12/28/2021  TX#: 2/12 Date: 1/5 3x10      HOLD  -glut sets in standing:10 x10 sec  -lateral walk  -quad sets at 0, 45 and 90 knee flexion: HOLD            HEP: quad sets, AAROM heel slides, glut sets, long sitting gastroc stretch.      Charges: Manual tech: 1 units , There ex: 2 unit

## 2022-01-11 NOTE — PROGRESS NOTES
Dx: Closed nondisplaced fracture of left patella, unspecified fracture morphology, initial encounter (S82.002A)         Insurance (Authorized # of Visits):  Galdino Patel (N/A)           Authorizing Physician: Dr. Juan May  Next MD visit: none scheduled  F to grossly 4+/5 to be able to get up and down from the floor safely   · Pt will demonstrate increased hip ER/ABD strength to 4/5 to perform stepping and squatting activities without excessive femoral IR/ADD   · Pt will improve SLS to >5 sec on airex pad to slides to 90 flexion: 3x10 reps    long sitting gastroc stretch: 3x30     -long sitting ankle DF: GTB, 2x10    -Quad sets: 20x10 sec hold    -supine SLR:3x10    -HS setting: at 25 deg knee flexion, 20x10 sec    -s/l hip abd: 2x10    -heel raises brace coleman

## 2022-01-12 ENCOUNTER — OFFICE VISIT (OUTPATIENT)
Dept: PHYSICAL THERAPY | Age: 77
End: 2022-01-12
Attending: ORTHOPAEDIC SURGERY
Payer: COMMERCIAL

## 2022-01-12 PROCEDURE — 97140 MANUAL THERAPY 1/> REGIONS: CPT | Performed by: PHYSICAL THERAPIST

## 2022-01-12 PROCEDURE — 97110 THERAPEUTIC EXERCISES: CPT | Performed by: PHYSICAL THERAPIST

## 2022-01-14 ENCOUNTER — OFFICE VISIT (OUTPATIENT)
Dept: PHYSICAL THERAPY | Age: 77
End: 2022-01-14
Attending: ORTHOPAEDIC SURGERY
Payer: COMMERCIAL

## 2022-01-14 PROCEDURE — 97140 MANUAL THERAPY 1/> REGIONS: CPT | Performed by: PHYSICAL THERAPIST

## 2022-01-14 PROCEDURE — 97110 THERAPEUTIC EXERCISES: CPT | Performed by: PHYSICAL THERAPIST

## 2022-01-14 NOTE — PROGRESS NOTES
Dx: Closed nondisplaced fracture of left patella, unspecified fracture morphology, initial encounter (S82.002A)         Insurance (Authorized # of Visits):  Everlyn Farm (N/A)           Authorizing Physician: Dr. Annabelle Uriostegui  Next MD visit: none scheduled  F on uneven surfaces such as grass and gravel  · Pt will be independent and compliant with comprehensive HEP to maintain progress achieved in PT    Plan: progress strength to weight bearing as tolerated  Date:1/7/22                 TX#: 4/12 Date: 1/12/22 brace donned: YTB, 3x10    -lateral walk brace donned and at counter support: YTB, 3x10 feet       HOLD  -glut sets in standing:10 x10 sec  -lateral walk  -quad sets at 0, 45 and 90 knee flexion: HOLD            HEP: quad sets, AAROM heel slides, glut sets

## 2022-01-18 ENCOUNTER — TELEPHONE (OUTPATIENT)
Dept: PHYSICAL THERAPY | Facility: HOSPITAL | Age: 77
End: 2022-01-18

## 2022-01-19 ENCOUNTER — APPOINTMENT (OUTPATIENT)
Dept: PHYSICAL THERAPY | Age: 77
End: 2022-01-19
Attending: ORTHOPAEDIC SURGERY
Payer: COMMERCIAL

## 2022-01-21 ENCOUNTER — APPOINTMENT (OUTPATIENT)
Dept: PHYSICAL THERAPY | Age: 77
End: 2022-01-21
Attending: ORTHOPAEDIC SURGERY
Payer: COMMERCIAL

## 2022-02-02 ENCOUNTER — OFFICE VISIT (OUTPATIENT)
Dept: PHYSICAL THERAPY | Age: 77
End: 2022-02-02
Attending: ORTHOPAEDIC SURGERY
Payer: COMMERCIAL

## 2022-02-02 PROCEDURE — 97140 MANUAL THERAPY 1/> REGIONS: CPT | Performed by: PHYSICAL THERAPIST

## 2022-02-02 PROCEDURE — 97110 THERAPEUTIC EXERCISES: CPT | Performed by: PHYSICAL THERAPIST

## 2022-02-04 ENCOUNTER — OFFICE VISIT (OUTPATIENT)
Dept: PHYSICAL THERAPY | Age: 77
End: 2022-02-04
Attending: ORTHOPAEDIC SURGERY
Payer: COMMERCIAL

## 2022-02-04 PROCEDURE — 97140 MANUAL THERAPY 1/> REGIONS: CPT | Performed by: PHYSICAL THERAPIST

## 2022-02-04 PROCEDURE — 97110 THERAPEUTIC EXERCISES: CPT | Performed by: PHYSICAL THERAPIST

## 2022-02-07 ENCOUNTER — APPOINTMENT (OUTPATIENT)
Dept: PHYSICAL THERAPY | Age: 77
End: 2022-02-07
Attending: ORTHOPAEDIC SURGERY
Payer: MEDICARE

## 2022-02-10 ENCOUNTER — TELEPHONE (OUTPATIENT)
Dept: PHYSICAL THERAPY | Facility: HOSPITAL | Age: 77
End: 2022-02-10

## 2022-02-10 ENCOUNTER — APPOINTMENT (OUTPATIENT)
Dept: PHYSICAL THERAPY | Age: 77
End: 2022-02-10
Attending: ORTHOPAEDIC SURGERY
Payer: MEDICARE

## 2022-02-16 ENCOUNTER — OFFICE VISIT (OUTPATIENT)
Dept: PHYSICAL THERAPY | Age: 77
End: 2022-02-16
Attending: ORTHOPAEDIC SURGERY
Payer: COMMERCIAL

## 2022-02-16 PROCEDURE — 97112 NEUROMUSCULAR REEDUCATION: CPT | Performed by: PHYSICAL THERAPIST

## 2022-02-16 PROCEDURE — 97110 THERAPEUTIC EXERCISES: CPT | Performed by: PHYSICAL THERAPIST

## 2022-02-18 ENCOUNTER — APPOINTMENT (OUTPATIENT)
Dept: PHYSICAL THERAPY | Age: 77
End: 2022-02-18
Attending: ORTHOPAEDIC SURGERY
Payer: MEDICARE

## 2022-02-18 ENCOUNTER — APPOINTMENT (OUTPATIENT)
Dept: PHYSICAL THERAPY | Age: 77
End: 2022-02-18
Attending: PHYSICAL THERAPIST
Payer: COMMERCIAL

## 2022-02-18 ENCOUNTER — TELEPHONE (OUTPATIENT)
Dept: PHYSICAL THERAPY | Facility: HOSPITAL | Age: 77
End: 2022-02-18

## 2022-02-23 ENCOUNTER — HOSPITAL ENCOUNTER (OUTPATIENT)
Dept: GENERAL RADIOLOGY | Age: 77
Discharge: HOME | End: 2022-02-23
Attending: ORTHOPAEDIC SURGERY
Payer: COMMERCIAL

## 2022-02-23 ENCOUNTER — OFFICE VISIT (OUTPATIENT)
Dept: ORTHOPEDICS CLINIC | Facility: CLINIC | Age: 77
End: 2022-02-23
Payer: COMMERCIAL

## 2022-02-23 ENCOUNTER — OFFICE VISIT (OUTPATIENT)
Dept: PHYSICAL THERAPY | Age: 77
End: 2022-02-23
Attending: PHYSICAL THERAPIST
Payer: COMMERCIAL

## 2022-02-23 DIAGNOSIS — S82.002A CLOSED NONDISPLACED FRACTURE OF LEFT PATELLA, UNSPECIFIED FRACTURE MORPHOLOGY, INITIAL ENCOUNTER: ICD-10-CM

## 2022-02-23 DIAGNOSIS — S82.002A CLOSED NONDISPLACED FRACTURE OF LEFT PATELLA, UNSPECIFIED FRACTURE MORPHOLOGY, INITIAL ENCOUNTER: Primary | ICD-10-CM

## 2022-02-23 PROCEDURE — 73560 X-RAY EXAM OF KNEE 1 OR 2: CPT | Performed by: ORTHOPAEDIC SURGERY

## 2022-02-23 PROCEDURE — 99024 POSTOP FOLLOW-UP VISIT: CPT | Performed by: ORTHOPAEDIC SURGERY

## 2022-02-23 PROCEDURE — 97112 NEUROMUSCULAR REEDUCATION: CPT | Performed by: PHYSICAL THERAPIST

## 2022-02-23 PROCEDURE — 97110 THERAPEUTIC EXERCISES: CPT | Performed by: PHYSICAL THERAPIST

## 2022-02-23 NOTE — PROGRESS NOTES
[facility] Fracture follow-up Progress Note      Date of Injury: 12/1/2021      History: The patient is a 64-year-old male presenting for follow-up assessment of his left patella fracture. He has tolerated physical therapy and hinged brace protection with no new pains. He relates improving motion, gait tolerance and no residual catching locking or swelling. Occasional clicking and popping is reported without associated pain. Physical Exam: His knee brace is removed. He has no effusion or palpable tenderness over the anterior left patella. Range of motion is excellent with full extension and 120 degrees of flexion. Knee is stable on varus valgus stress with a negative Lachman and posterior drawer. Gait is steady and fluid out of the brace without antalgia. Distal exam is without edema. Imaging Results: Multiple views left knee personally viewed, demonstrating no interval change in a nondisplaced transverse left patella fracture with no significant intra-articular step-off and progressive radiographic signs of healing. Assessment: Diagnoses and all orders for this visit:    Closed nondisplaced fracture of left patella, unspecified fracture morphology, initial encounter  -     XR KNEE (1 OR 2 VIEWS), LEFT (CPT=73560); Future      Plan: Overall the patient has done well with closed treatment of his left patella fracture. Gentle close kinetic chain strengthening conditioning may progress as tolerated to regain endurance. Fall prevention was emphasized. He may completely discontinue the brace. No need for radiographic follow-up unless he has new symptoms or concerns. All questions were answered and he verbalized understanding and appreciation. Follow-up as needed. Shaan Suggs MD  [address] [place] [address] Surgery    The dictation was partially prepared using [employer] voice recognition software.   Although every attempt is made to correct errors where identified, discrepancies may still exist.

## 2022-02-25 ENCOUNTER — APPOINTMENT (OUTPATIENT)
Dept: PHYSICAL THERAPY | Age: 77
End: 2022-02-25
Attending: PHYSICAL THERAPIST
Payer: COMMERCIAL

## 2022-03-04 ENCOUNTER — APPOINTMENT (OUTPATIENT)
Dept: PHYSICAL THERAPY | Age: 77
End: 2022-03-04
Attending: PHYSICAL THERAPIST
Payer: MEDICARE

## 2022-03-09 ENCOUNTER — APPOINTMENT (OUTPATIENT)
Dept: PHYSICAL THERAPY | Age: 77
End: 2022-03-09
Attending: PHYSICAL THERAPIST
Payer: MEDICARE

## 2022-03-11 ENCOUNTER — APPOINTMENT (OUTPATIENT)
Dept: PHYSICAL THERAPY | Age: 77
End: 2022-03-11
Attending: PHYSICAL THERAPIST
Payer: MEDICARE

## 2022-03-16 ENCOUNTER — APPOINTMENT (OUTPATIENT)
Dept: PHYSICAL THERAPY | Age: 77
End: 2022-03-16
Attending: PHYSICAL THERAPIST
Payer: MEDICARE

## 2022-03-18 ENCOUNTER — APPOINTMENT (OUTPATIENT)
Dept: PHYSICAL THERAPY | Age: 77
End: 2022-03-18
Attending: PHYSICAL THERAPIST
Payer: MEDICARE

## 2023-10-30 NOTE — ED INITIAL ASSESSMENT (HPI)
Received patient per medics patient is sitting upright  extremely pale and cachetic looking  He has not moved his bowel in a few days  He said his last movement was black  He went to the immediate care on Saturday new DX Emphysema  72/49 84 88/49 82 99/6 p Name band;

## 2024-04-24 NOTE — PAT NURSING NOTE
PAT nursing note: park [address] garage; register in [facility] @7360(tentative)-will call 4/26 to confirm; npo after midnight; continue to take all prescribed medications except: the morning of surgery only use your morning inhaler; stop all vitamins, supplements, NSAIDs starting now; denies taking ACEs, ARBs, beta blockers, ASA, blood thinners, diabetic or weight loss medications; no PPM, ICD or nerve/spinal cord stimulator; admit; keep personal belongings to a minimum: bring pajama bottoms, toiletries, eye glass case, and/or denture cup/supplies/hearing aid container, photo ID/insurance information; keep all valuables at home; shower with antibacterial soap; admit; 2 visitors welcome; needs T&S on admit-has h/o blood transfusions-see lab results.

## 2024-04-29 ENCOUNTER — HOSPITAL ENCOUNTER (INPATIENT)
Facility: HOSPITAL | Age: 79
LOS: 3 days | Discharge: HOME | End: 2024-05-02
Attending: SURGERY | Admitting: SURGERY
Payer: COMMERCIAL

## 2024-04-29 ENCOUNTER — ANESTHESIA (OUTPATIENT)
Dept: CARDIAC SURGERY | Facility: HOSPITAL | Age: 79
End: 2024-04-29
Payer: COMMERCIAL

## 2024-04-29 ENCOUNTER — ANESTHESIA EVENT (OUTPATIENT)
Dept: CARDIAC SURGERY | Facility: HOSPITAL | Age: 79
End: 2024-04-29
Payer: COMMERCIAL

## 2024-04-29 DIAGNOSIS — Z91.89 AT RISK FOR BLEEDING: ICD-10-CM

## 2024-04-29 DIAGNOSIS — I71.9 AORTIC ANEURYSM WITHOUT RUPTURE, UNSPECIFIED PORTION OF AORTA (HCC): ICD-10-CM

## 2024-04-29 DIAGNOSIS — Z01.818 PRE-OP TESTING: ICD-10-CM

## 2024-04-29 DIAGNOSIS — G89.18 POSTOPERATIVE PAIN: ICD-10-CM

## 2024-04-29 DIAGNOSIS — I71.42 JUXTARENAL ABDOMINAL AORTIC ANEURYSM (AAA) WITHOUT RUPTURE (HCC): Primary | ICD-10-CM

## 2024-04-29 LAB
ANTIBODY SCREEN: NEGATIVE
BASE EXCESS BLD CALC-SCNC: -1 MMOL/L
CA-I BLD-SCNC: 1.18 MMOL/L (ref 1.12–1.32)
CO2 BLD-SCNC: 26 MMOL/L (ref 22–32)
GLUCOSE BLD-MCNC: 119 MG/DL (ref 70–99)
GLUCOSE BLD-MCNC: 97 MG/DL (ref 70–99)
HCO3 BLD-SCNC: 24.8 MEQ/L
HCT VFR BLD AUTO: 37.2 %
HCT VFR BLD CALC: 35 %
HGB BLD-MCNC: 12.2 G/DL
ISTAT ACTIVATED CLOTTING TIME: 152 SECONDS (ref 74–137)
ISTAT ACTIVATED CLOTTING TIME: 260 SECONDS (ref 74–137)
ISTAT ACTIVATED CLOTTING TIME: 266 SECONDS (ref 74–137)
ISTAT ACTIVATED CLOTTING TIME: 266 SECONDS (ref 74–137)
ISTAT ACTIVATED CLOTTING TIME: 271 SECONDS (ref 74–137)
MRSA DNA SPEC QL NAA+PROBE: NEGATIVE
PCO2 BLD: 46.8 MMHG
PH BLD: 7.33 [PH]
PO2 BLD: 524 MMHG
POTASSIUM BLD-SCNC: 3.5 MMOL/L (ref 3.6–5.1)
RH BLOOD TYPE: POSITIVE
SAO2 % BLD: 100 %
SODIUM BLD-SCNC: 139 MMOL/L (ref 136–145)

## 2024-04-29 PROCEDURE — B41JYZZ FLUOROSCOPY OF OTHER LOWER ARTERIES USING OTHER CONTRAST: ICD-10-PCS | Performed by: SURGERY

## 2024-04-29 PROCEDURE — 84295 ASSAY OF SERUM SODIUM: CPT

## 2024-04-29 PROCEDURE — 87641 MR-STAPH DNA AMP PROBE: CPT | Performed by: HOSPITALIST

## 2024-04-29 PROCEDURE — 86850 RBC ANTIBODY SCREEN: CPT | Performed by: SURGERY

## 2024-04-29 PROCEDURE — B440ZZ3 ULTRASONOGRAPHY OF ABDOMINAL AORTA, INTRAVASCULAR: ICD-10-PCS | Performed by: SURGERY

## 2024-04-29 PROCEDURE — 04V03FZ RESTRICTION OF ABDOMINAL AORTA WITH BRANCHED OR FENESTRATED INTRALUMINAL DEVICE, THREE OR MORE ARTERIES, PERCUTANEOUS APPROACH: ICD-10-PCS | Performed by: SURGERY

## 2024-04-29 PROCEDURE — 85347 COAGULATION TIME ACTIVATED: CPT

## 2024-04-29 PROCEDURE — 82962 GLUCOSE BLOOD TEST: CPT

## 2024-04-29 PROCEDURE — 85014 HEMATOCRIT: CPT | Performed by: SURGERY

## 2024-04-29 PROCEDURE — 86920 COMPATIBILITY TEST SPIN: CPT

## 2024-04-29 PROCEDURE — B414YZZ FLUOROSCOPY OF SUPERIOR MESENTERIC ARTERY USING OTHER CONTRAST: ICD-10-PCS | Performed by: SURGERY

## 2024-04-29 PROCEDURE — B41BYZZ FLUOROSCOPY OF OTHER INTRA-ABDOMINAL ARTERIES USING OTHER CONTRAST: ICD-10-PCS | Performed by: SURGERY

## 2024-04-29 PROCEDURE — 86900 BLOOD TYPING SEROLOGIC ABO: CPT | Performed by: SURGERY

## 2024-04-29 PROCEDURE — 82803 BLOOD GASES ANY COMBINATION: CPT

## 2024-04-29 PROCEDURE — B418YZZ FLUOROSCOPY OF BILATERAL RENAL ARTERIES USING OTHER CONTRAST: ICD-10-PCS | Performed by: SURGERY

## 2024-04-29 PROCEDURE — B44BZZ3 ULTRASONOGRAPHY OF OTHER INTRA-ABDOMINAL ARTERIES, INTRAVASCULAR: ICD-10-PCS | Performed by: SURGERY

## 2024-04-29 PROCEDURE — 85018 HEMOGLOBIN: CPT | Performed by: SURGERY

## 2024-04-29 PROCEDURE — 84132 ASSAY OF SERUM POTASSIUM: CPT

## 2024-04-29 PROCEDURE — 85014 HEMATOCRIT: CPT

## 2024-04-29 PROCEDURE — B410YZZ FLUOROSCOPY OF ABDOMINAL AORTA USING OTHER CONTRAST: ICD-10-PCS | Performed by: SURGERY

## 2024-04-29 PROCEDURE — 82330 ASSAY OF CALCIUM: CPT

## 2024-04-29 PROCEDURE — 86901 BLOOD TYPING SEROLOGIC RH(D): CPT | Performed by: SURGERY

## 2024-04-29 DEVICE — GRAFT EVAR ENDOPROS STNT CVR REDUC PROF 8MM X: Type: IMPLANTABLE DEVICE | Status: FUNCTIONAL

## 2024-04-29 DEVICE — EXCLUDER CONFORM AAA TRNK ENDO 36MMX14.5MMX14CM 18FR
Type: IMPLANTABLE DEVICE | Status: FUNCTIONAL
Brand: GORE EXCLUDER CONF AAA ENDO - TRUNK-IPSILATERAL

## 2024-04-29 DEVICE — EXCLUDER AAA ENDO CONTRA LEG 16MMX23MMX10CM 14FR
Type: IMPLANTABLE DEVICE | Status: FUNCTIONAL
Brand: GORE EXCLUDER AAA ENDOPROSTHESIS

## 2024-04-29 DEVICE — EXCLUDER AAA ENDO CONTRA LEG 16MMX27MMX14CM 15FR
Type: IMPLANTABLE DEVICE | Status: FUNCTIONAL
Brand: GORE EXCLUDER AAA ENDOPROSTHESIS

## 2024-04-29 DEVICE — IMPLANTABLE DEVICE: Type: IMPLANTABLE DEVICE | Status: FUNCTIONAL

## 2024-04-29 DEVICE — EXCLUDER AAA ENDO CONTRA LEG 16MMX23MMX12CM 14FR
Type: IMPLANTABLE DEVICE | Status: FUNCTIONAL
Brand: GORE EXCLUDER AAA ENDOPROSTHESIS

## 2024-04-29 RX ORDER — DEXAMETHASONE SODIUM PHOSPHATE 4 MG/ML
VIAL (ML) INJECTION AS NEEDED
Status: DISCONTINUED | OUTPATIENT
Start: 2024-04-29 | End: 2024-04-29 | Stop reason: SURG

## 2024-04-29 RX ORDER — HYDROMORPHONE HYDROCHLORIDE 1 MG/ML
0.2 INJECTION, SOLUTION INTRAMUSCULAR; INTRAVENOUS; SUBCUTANEOUS EVERY 2 HOUR PRN
Status: DISCONTINUED | OUTPATIENT
Start: 2024-04-29 | End: 2024-05-02

## 2024-04-29 RX ORDER — SODIUM CHLORIDE, SODIUM LACTATE, POTASSIUM CHLORIDE, CALCIUM CHLORIDE 600; 310; 30; 20 MG/100ML; MG/100ML; MG/100ML; MG/100ML
INJECTION, SOLUTION INTRAVENOUS CONTINUOUS
Status: DISCONTINUED | OUTPATIENT
Start: 2024-04-29 | End: 2024-04-30

## 2024-04-29 RX ORDER — HYDROMORPHONE HYDROCHLORIDE 1 MG/ML
0.6 INJECTION, SOLUTION INTRAMUSCULAR; INTRAVENOUS; SUBCUTANEOUS EVERY 5 MIN PRN
Status: DISPENSED | OUTPATIENT
Start: 2024-04-29 | End: 2024-04-29

## 2024-04-29 RX ORDER — HEPARIN SODIUM 1000 [USP'U]/ML
INJECTION, SOLUTION INTRAVENOUS; SUBCUTANEOUS AS NEEDED
Status: DISCONTINUED | OUTPATIENT
Start: 2024-04-29 | End: 2024-04-29 | Stop reason: SURG

## 2024-04-29 RX ORDER — CEFAZOLIN SODIUM/WATER 2 G/20 ML
SYRINGE (ML) INTRAVENOUS
Status: COMPLETED
Start: 2024-04-29 | End: 2024-04-29

## 2024-04-29 RX ORDER — BUPIVACAINE HYDROCHLORIDE 5 MG/ML
INJECTION, SOLUTION EPIDURAL; INTRACAUDAL AS NEEDED
Status: DISCONTINUED | OUTPATIENT
Start: 2024-04-29 | End: 2024-04-29 | Stop reason: HOSPADM

## 2024-04-29 RX ORDER — TAFLUPROST OPTHALMIC 0 MG/.3ML
1 SOLUTION/ DROPS OPHTHALMIC DAILY
Status: DISCONTINUED | OUTPATIENT
Start: 2024-04-29 | End: 2024-05-02

## 2024-04-29 RX ORDER — HYDROMORPHONE HYDROCHLORIDE 1 MG/ML
0.6 INJECTION, SOLUTION INTRAMUSCULAR; INTRAVENOUS; SUBCUTANEOUS EVERY 2 HOUR PRN
Status: DISCONTINUED | OUTPATIENT
Start: 2024-04-29 | End: 2024-05-02

## 2024-04-29 RX ORDER — ONDANSETRON 2 MG/ML
4 INJECTION INTRAMUSCULAR; INTRAVENOUS EVERY 6 HOURS PRN
Status: DISCONTINUED | OUTPATIENT
Start: 2024-04-29 | End: 2024-04-29

## 2024-04-29 RX ORDER — TAFLUPROST OPTHALMIC 0 MG/.3ML
1 SOLUTION/ DROPS OPHTHALMIC DAILY
Status: DISCONTINUED | OUTPATIENT
Start: 2024-04-29 | End: 2024-04-29

## 2024-04-29 RX ORDER — HYDROCODONE BITARTRATE AND ACETAMINOPHEN 5; 325 MG/1; MG/1
2 TABLET ORAL EVERY 4 HOURS PRN
Status: DISCONTINUED | OUTPATIENT
Start: 2024-04-29 | End: 2024-05-02

## 2024-04-29 RX ORDER — PROTAMINE SULFATE 10 MG/ML
INJECTION, SOLUTION INTRAVENOUS AS NEEDED
Status: DISCONTINUED | OUTPATIENT
Start: 2024-04-29 | End: 2024-04-29 | Stop reason: SURG

## 2024-04-29 RX ORDER — ALBUTEROL SULFATE 90 UG/1
2 AEROSOL, METERED RESPIRATORY (INHALATION) EVERY 6 HOURS PRN
Status: DISCONTINUED | OUTPATIENT
Start: 2024-04-29 | End: 2024-05-02

## 2024-04-29 RX ORDER — ONDANSETRON 2 MG/ML
INJECTION INTRAMUSCULAR; INTRAVENOUS AS NEEDED
Status: DISCONTINUED | OUTPATIENT
Start: 2024-04-29 | End: 2024-04-29

## 2024-04-29 RX ORDER — ONDANSETRON 2 MG/ML
4 INJECTION INTRAMUSCULAR; INTRAVENOUS EVERY 6 HOURS PRN
Status: DISCONTINUED | OUTPATIENT
Start: 2024-04-29 | End: 2024-05-02

## 2024-04-29 RX ORDER — HEPARIN SODIUM 5000 [USP'U]/ML
5000 INJECTION, SOLUTION INTRAVENOUS; SUBCUTANEOUS EVERY 8 HOURS SCHEDULED
Status: DISCONTINUED | OUTPATIENT
Start: 2024-04-29 | End: 2024-05-02

## 2024-04-29 RX ORDER — HYDROMORPHONE HYDROCHLORIDE 1 MG/ML
0.2 INJECTION, SOLUTION INTRAMUSCULAR; INTRAVENOUS; SUBCUTANEOUS EVERY 5 MIN PRN
Status: ACTIVE | OUTPATIENT
Start: 2024-04-29 | End: 2024-04-29

## 2024-04-29 RX ORDER — SODIUM CHLORIDE 9 MG/ML
INJECTION, SOLUTION INTRAVENOUS CONTINUOUS PRN
Status: DISCONTINUED | OUTPATIENT
Start: 2024-04-29 | End: 2024-04-29 | Stop reason: SURG

## 2024-04-29 RX ORDER — ACETAMINOPHEN 325 MG/1
650 TABLET ORAL EVERY 4 HOURS PRN
Status: DISCONTINUED | OUTPATIENT
Start: 2024-04-29 | End: 2024-05-02

## 2024-04-29 RX ORDER — NICOTINE 21 MG/24HR
1 PATCH, TRANSDERMAL 24 HOURS TRANSDERMAL DAILY
Status: DISCONTINUED | OUTPATIENT
Start: 2024-04-29 | End: 2024-05-02

## 2024-04-29 RX ORDER — HYDROCODONE BITARTRATE AND ACETAMINOPHEN 5; 325 MG/1; MG/1
1 TABLET ORAL EVERY 4 HOURS PRN
Status: DISCONTINUED | OUTPATIENT
Start: 2024-04-29 | End: 2024-05-02

## 2024-04-29 RX ORDER — NALOXONE HYDROCHLORIDE 0.4 MG/ML
0.08 INJECTION, SOLUTION INTRAMUSCULAR; INTRAVENOUS; SUBCUTANEOUS AS NEEDED
Status: ACTIVE | OUTPATIENT
Start: 2024-04-29 | End: 2024-04-29

## 2024-04-29 RX ORDER — HEPARIN SODIUM 5000 [USP'U]/ML
5000 INJECTION, SOLUTION INTRAVENOUS; SUBCUTANEOUS ONCE
Status: DISCONTINUED | OUTPATIENT
Start: 2024-04-29 | End: 2024-04-29 | Stop reason: HOSPADM

## 2024-04-29 RX ORDER — HYDROMORPHONE HYDROCHLORIDE 1 MG/ML
0.4 INJECTION, SOLUTION INTRAMUSCULAR; INTRAVENOUS; SUBCUTANEOUS EVERY 5 MIN PRN
Status: DISPENSED | OUTPATIENT
Start: 2024-04-29 | End: 2024-04-29

## 2024-04-29 RX ORDER — HYDROMORPHONE HYDROCHLORIDE 1 MG/ML
0.4 INJECTION, SOLUTION INTRAMUSCULAR; INTRAVENOUS; SUBCUTANEOUS EVERY 2 HOUR PRN
Status: DISCONTINUED | OUTPATIENT
Start: 2024-04-29 | End: 2024-05-02

## 2024-04-29 RX ORDER — LIDOCAINE HYDROCHLORIDE 10 MG/ML
INJECTION, SOLUTION EPIDURAL; INFILTRATION; INTRACAUDAL; PERINEURAL AS NEEDED
Status: DISCONTINUED | OUTPATIENT
Start: 2024-04-29 | End: 2024-04-29 | Stop reason: SURG

## 2024-04-29 RX ORDER — ROCURONIUM BROMIDE 10 MG/ML
INJECTION, SOLUTION INTRAVENOUS AS NEEDED
Status: DISCONTINUED | OUTPATIENT
Start: 2024-04-29 | End: 2024-04-29 | Stop reason: SURG

## 2024-04-29 RX ORDER — METOCLOPRAMIDE HYDROCHLORIDE 5 MG/ML
10 INJECTION INTRAMUSCULAR; INTRAVENOUS EVERY 8 HOURS PRN
Status: DISCONTINUED | OUTPATIENT
Start: 2024-04-29 | End: 2024-05-02

## 2024-04-29 RX ADMIN — ONDANSETRON 4 MG: 2 INJECTION INTRAMUSCULAR; INTRAVENOUS at 14:02:00

## 2024-04-29 RX ADMIN — DEXAMETHASONE SODIUM PHOSPHATE 4 MG: 4 MG/ML VIAL (ML) INJECTION at 11:01:00

## 2024-04-29 RX ADMIN — ROCURONIUM BROMIDE 60 MG: 10 INJECTION, SOLUTION INTRAVENOUS at 10:57:00

## 2024-04-29 RX ADMIN — SODIUM CHLORIDE: 9 INJECTION, SOLUTION INTRAVENOUS at 10:47:00

## 2024-04-29 RX ADMIN — HEPARIN SODIUM 9000 UNITS: 1000 INJECTION, SOLUTION INTRAVENOUS; SUBCUTANEOUS at 11:50:00

## 2024-04-29 RX ADMIN — ROCURONIUM BROMIDE 10 MG: 10 INJECTION, SOLUTION INTRAVENOUS at 12:55:00

## 2024-04-29 RX ADMIN — ROCURONIUM BROMIDE 10 MG: 10 INJECTION, SOLUTION INTRAVENOUS at 13:21:00

## 2024-04-29 RX ADMIN — LIDOCAINE HYDROCHLORIDE 50 MG: 10 INJECTION, SOLUTION EPIDURAL; INFILTRATION; INTRACAUDAL; PERINEURAL at 10:54:00

## 2024-04-29 RX ADMIN — ROCURONIUM BROMIDE 10 MG: 10 INJECTION, SOLUTION INTRAVENOUS at 12:22:00

## 2024-04-29 RX ADMIN — PROTAMINE SULFATE 50 MG: 10 INJECTION, SOLUTION INTRAVENOUS at 14:02:00

## 2024-04-29 NOTE — ANESTHESIA PREPROCEDURE EVALUATION
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  Temp: 97 °F (36.1 °C)  Pulse: 80  Resp: 19  BP: 141/97  SpO2: 98 %  Body mass index is 22.67 kg/m².    Current medications reviewed.  Hospital Medications:   heparin (Porcine) 5000 UNIT/ML injection 5,000 Units  5,000 Units Subcutaneous Once    [COMPLETED] ceFAZolin (Ancef) 2 g/20mL IV syringe premix           Outpatient Medications:     Medications Prior to Admission   Medication Sig Dispense Refill Last Dose    ANORO ELLIPTA 62.5-25 MCG/INH Inhalation Aerosol Powder, Breath Activated INHALE 1 PUFF INTO THE LUNGS DAILY 60 each 11 4/29/2024    Tafluprost 0.0015 % Ophthalmic Solution Place 1 drop into both eyes nightly.         Multiple Vitamins-Minerals (ALIVE MENS ENERGY) Oral Tab Take 1 tablet by mouth daily.   4/28/2024    Albuterol Sulfate HFA (PROAIR HFA) 108 (90 Base) MCG/ACT Inhalation Aero Soln Inhale 2 puffs into the lungs every 6 (six) hours as needed for Wheezing or Shortness of Breath. 1 Inhaler 11 More than a month       Allergies: Brimonidine and Latanoprost      Anesthesia Evaluation    Patient summary reviewed.    Anesthetic Complications  (-) history of anesthetic complications         GI/Hepatic/Renal    Negative GI/hepatic/renal ROS.                              Cardiovascular                  (+) hypertension   (+) hyperlipidemia               (+) dysrhythmias and PVC                  Endo/Other                                  Pulmonary        (+) COPD                   Neuro/Psych                              PVD, AAA        Past Surgical History:   Procedure Laterality Date    Colonoscopy N/A 10/22/2017    Procedure: COLONOSCOPY;  Surgeon: Mona Jacome MD;  Location:  ENDOSCOPY    Colonoscopy      Eye surgery  01/2017    right eye surgery for lower lid turned in     Tonsillectomy      Upper gi endoscopy,exam       Social History     Socioeconomic History    Marital status:    Tobacco Use    Smoking status: Every Day     Current packs/day: 1.00     Average packs/day: 1 pack/day for 67.3 years (67.3 ttl pk-yrs)     Types: Cigarettes     Start date: 1/26/1957    Smokeless tobacco: Never    Tobacco comments:     was smoking 1ppd or more for >50 years   Vaping Use    Vaping status: Never Used   Substance and Sexual Activity    Alcohol use: No    Drug use: No    Sexual activity: Never     History   Drug Use No     Available pre-op labs reviewed.               Airway      Mallampati: III  Mouth opening: 3 FB  TM distance: 4 - 6 cm   Cardiovascular             Dental      Dental appliance(s): lower dentures and upper dentures       Pulmonary                     Other findings              ASA: 4   Plan: general  NPO status verified and     Post-procedure pain management plan discussed with surgeon and patient.    Comment: GETA/LMA discussed in detail.  Risk of complications discussed including but not limited to sore throat, cough, PONV discussed. Also, discussed risks including dental injury particularly on any weakened, treated or diseased teeth & pt wishes to proceed  All questions answered.    Plan/risks discussed with: patient  Use of blood product(s) discussed with: patient    Consented to blood products.          Present on  Admission:  **None**

## 2024-04-29 NOTE — ANESTHESIA PROCEDURE NOTES
Central Line    Date/Time: 4/29/2024 11:05 AM    Performed by: Robbie Fernandez MD  Authorized by: Robbie Fernandez MD    General Information and Staff    Procedure Start:  4/29/2024 11:05 AM  Procedure End:  4/29/2024 11:10 AM  Anesthesiologist:  Robbie Fernandez MD  Performed by:  Anesthesiologist  Patient Location:  OR  Indication: central venous access and CVP monitoring    Site Identification: real time ultrasound guided and image stored and retrievable        Procedure Detail    Patient Position:  Trendelenburg  Laterality:  Right  Site:  Internal jugular  Prep:  Chloraprep  Catheter Size:  9 Fr  Catheter Type:  MAC introducer  Number of Lumens:  Double lumen  Procedure Detail: target vein identified, needle advanced into vein and blood aspirated and guidewire advanced into vein    Seldinger Technique?: Yes    Intravenous Verification: verified by ultrasound and venous blood return    Post Insertion: all ports aspirated, all ports flushed easily, guidewire was removed intact, line was sutured in place and dressing was applied      Assessment    Events: patient tolerated procedure well with no complications      PA Catheter Placement    PA Catheter Placed?: No      Additional Comments

## 2024-04-29 NOTE — BRIEF OP NOTE
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 Doppler pedal signals     Specimen: none     Estimated Blood Loss: 300 mL     Warren Martin MD  4/29/2024  2:35 PM

## 2024-04-29 NOTE — CONSULTS
[facility] H&P       CC: No chief complaint on file.       PCP: Delmis Barnard MD    History of Present Illness:    Patient is an 78-year-old male with signal past medical history of smoking, COPD, just renal abdominal aortic aneurysm, glaucoma, peripheral vascular disease presented status post just renal abdominal aortic aneurysm repair, postop patient is doing well however did get up a little too early and had some mild oozing on the right groin, oozing is well-controlled at this time, dopplerable pulses present bilaterally, patient's blood pressure is also manageable, otherwise doing well and complaining of significant mount of abdominal pain that has been controlled by narcotic medications, no chest pain no lightheadedness no dizziness no numbness or tingling in bilateral lower extremities      PMH  Past Medical History:    Cataract    COPD (chronic obstructive pulmonary disease) (HCC)    Glaucoma    History of blood transfusion    History of stomach ulcers    Peripheral vascular disease (HCC)    Pneumonia due to organism    Visual impairment    glasses        PSH  Past Surgical History:   Procedure Laterality Date    Colonoscopy N/A 10/22/2017    Procedure: COLONOSCOPY;  Surgeon: Mona Jacome MD;  Location:  ENDOSCOPY    Colonoscopy      Eye surgery  01/2017    right eye surgery for lower lid turned in     Tonsillectomy      Upper gi endoscopy,exam          ALL:  Allergies   Allergen Reactions    Brimonidine OTHER (SEE COMMENTS)     Brow abscess and periorbital dermatitis    Latanoprost OTHER (SEE COMMENTS)     Brow abscess and periorbital dermatitis        Home Medications:  Outpatient Medications Marked as Taking for the 4/29/24 encounter (Hospital Encounter)   Medication Sig Dispense Refill    ANORO ELLIPTA 62.5-25 MCG/INH Inhalation Aerosol Powder, Breath Activated INHALE 1 PUFF INTO THE LUNGS DAILY 60 each 11    Albuterol Sulfate HFA (PROAIR HFA) 108 (90 Base) MCG/ACT Inhalation Aero  Soln Inhale 2 puffs into the lungs every 6 (six) hours as needed for Wheezing or Shortness of Breath. 1 Inhaler 11    Tafluprost 0.0015 % Ophthalmic Solution Place 1 drop into both eyes nightly.        Multiple Vitamins-Minerals (ALIVE MENS ENERGY) Oral Tab Take 1 tablet by mouth daily.           Soc Hx  Social History     Tobacco Use    Smoking status: Every Day     Current packs/day: 1.00     Average packs/day: 1 pack/day for 67.3 years (67.3 ttl pk-yrs)     Types: Cigarettes     Start date: 1/26/1957    Smokeless tobacco: Never    Tobacco comments:     was smoking 1ppd or more for >50 years   Substance Use Topics    Alcohol use: No        Fam Hx  Family History   Problem Relation Age of Onset    Heart Disease Father     Cancer Mother        Review of Systems  General: Denies unintentional weight loss, fevers, or chills  HEENT: Denies vision loss or double vision, denies hearing loss  Cardiovascular: Denies chest pain, palpitations, peripheral edema  Pulmonary: Denies cough, shortness of breath, or wheezing  Gastrointestinal: Denies abdominal pain, melena, or hematochezia  Genitourinary: Denies urinary frequency, urgency, and dysuria  Neurologic: Denies numbness, headaches, focal weakness  Skin: Denies rashes, sores  Endocrine: Denies heat or cold intolerance, denies polydipsia  Hematologic: Denies abnormal bleeding or bruising     OBJECTIVE:  /78   Pulse 70   Temp 96.7 °F (35.9 °C) (Temporal)   Resp 19   Ht 5' 10\" (1.778 m)   Wt 158 lb (71.7 kg)   SpO2 97%   BMI 22.67 kg/m²     BP Readings from Last 3 Encounters:   04/29/24 150/78   12/02/21 122/70   07/22/21 126/86     Wt Readings from Last 3 Encounters:   04/24/24 158 lb (71.7 kg)   12/02/21 165 lb (74.8 kg)   12/03/20 169 lb (76.7 kg)       Wt Readings from Last 6 Encounters:   04/24/24 158 lb (71.7 kg)   12/02/21 165 lb (74.8 kg)   12/03/20 169 lb (76.7 kg)   10/06/20 167 lb (75.8 kg)   09/15/20 166 lb 10.7 oz (75.6 kg)   11/26/19 160 lb (72.6  kg)     Gen: No acute distress, alert and oriented x 3 however moaning and groaning and sleepy  Pulm: Lungs clear bilaterally, good inspiratory effort   CV:  nL S1/S2  Abd: soft, NT/ND, no hepatomegaly, +BS groin incisions clean dry and intact, right incision slightly oozing with a small hematoma around it-  MSK: moving all extremities, no edema as above  Neuro: no focal deficits  Skin: no rashes/lesions  Psych: normal mood/affect          Diagnostic Data:    CBC/Chem  No results for input(s): \"WBC\", \"HGB\", \"MCV\", \"PLT\", \"BAND\", \"INR\" in the last 168 hours.    Invalid input(s): \"LYM#\", \"MONO#\", \"BASOS#\", \"EOSIN#\"    No results for input(s): \"NA\", \"K\", \"CL\", \"CO2\", \"BUN\", \"CREATSERUM\", \"GLU\", \"CA\", \"CAION\", \"MG\", \"PHOS\" in the last 168 hours.    No results for input(s): \"ALT\", \"AST\", \"ALB\", \"AMYLASE\", \"LIPASE\", \"LDH\" in the last 168 hours.    Invalid input(s): \"ALPHOS\", \"TBIL\", \"DBIL\", \"TPROT\"    No results for input(s): \"TROP\" in the last 168 hours.      Radiology: No results found.         ASSESSMENT / PLAN:     Patient is an 78-year-old male with signal past medical history of smoking, COPD, just renal abdominal aortic aneurysm, glaucoma, peripheral vascular disease presented status post just renal abdominal aortic aneurysm repair,    # Abdominal aortic aneurysm status post repair  # Fenestrated EVAR  -Postop day 0, pain control, postop antibiotics, fluids per vascular surgery  -Dopplerable pedal pulses bilaterally present  -Check groin for hematoma  -Pain control  -Management to the ICU for blood pressure possible discharge in the next 24 hours once patient is more ambulatory    # Smoking history of COPD  #  -Nicotine patch    # Peripheral vascular disease    Prophy:  DVT: Heparin  Deconditioning prevention: PT OT    Dispo: admit to ICU    Outpatient records reviewed confirming patient's medical history and medications.     Further recommendations pending patient's clinical course.  [facility] hospitalist to continue to  follow patient while in house    Time spent: greater than 90 minutes spent in d/w pt/family, coordination of care, and d/w staff.     Subha iglesias MD   Internal Medicine  [facility]  Pager: [phone]      **Certification      PHYSICIAN Certification of Need for Inpatient Hospitalization - Initial Certification    Patient will require inpatient services that will reasonably be expected to span two midnight's based on the clinical documentation in H+P.   Based on patients current state of illness, I anticipate that, after discharge, patient will require TBD.

## 2024-04-29 NOTE — ANESTHESIA PROCEDURE NOTES
Airway  Date/Time: 4/29/2024 10:59 AM  Urgency: elective      General Information and Staff    Patient location during procedure: OR  Anesthesiologist: Robbie Fernandez MD  Performed: anesthesiologist   Performed by: Robbie Fernandez MD  Authorized by: Robbie Fernandez MD      Indications and Patient Condition  Indications for airway management: anesthesia  Sedation level: deep  Preoxygenated: yes  Patient position: sniffing  Mask difficulty assessment: 1 - vent by mask    Final Airway Details  Final airway type: endotracheal airway      Successful airway: ETT  Cuffed: yes   Successful intubation technique: direct laryngoscopy  Endotracheal tube insertion site: oral  Blade: Jarvis  Blade size: #3  ETT size (mm): 8.0    Cormack-Lehane Classification: grade I - full view of glottis  Placement verified by: capnometry   Cuff volume (mL): 10  Measured from: lips  ETT to lips (cm): 23  Number of attempts at approach: 1    Additional Comments  atraumatic

## 2024-04-29 NOTE — PLAN OF CARE
1415: Upon arrival, Pt alert and oriented x3. Pt able to voice needs and follow commands. Pt with bilateral groin punctures in place, no hematoma, soft, c/d/I. Pt laying flat for 4 hours per verbal Dr. Martin. Pt cleared to sit up at 6:15 p.m.     1515: Pt sat up, after being told multiple times that laying flat is necessary, and the complications that can occur if sitting up. Pt understands.     1510: Called pt's spouse Nikia and left general vm. Checked family waiting room and spouse not present.     1730: Pt's right groin with minor oozing, no hematoma, soft. Quick clot placed with Tegaderm. Pedal pulses via doppler present.     Pt spouse at bedside. Call light within reach. Plan of care ongoing.

## 2024-04-29 NOTE — ANESTHESIA POSTPROCEDURE EVALUATION
[facility]    Harpreet Lee Patient Status:  Inpatient   Age/Gender 78 year old male MRN [id number]   Location [facility] 6NE-A Attending Warren Martin MD   Hosp Day # 0 PCP Delmis Barnard MD       Anesthesia Post-op Note    ENDOVASCULAR ABDOMINAL AORTIC ANEURYSM STENT GRAFT REPAIR WITH FENESTRATED DEVICE; SEE CATH LAB CHARTING FOR ADDITIONAL DETAILS    Procedure Summary       Date: 04/29/24 Room / Location:  CV[facility] 03 HYBRID /  CV[facility]    Anesthesia Start: 1047 Anesthesia Stop: 1428    Procedure: ENDOVASCULAR ABDOMINAL AORTIC ANEURYSM STENT GRAFT REPAIR WITH FENESTRATED DEVICE; SEE CATH LAB CHARTING FOR ADDITIONAL DETAILS Diagnosis: (juxtarenal abdominal aortic aneurysm without rupture aoritc aneurysm without rupture, unspecified portion of aorta)    Surgeons: Warren Martin MD Anesthesiologist: Robbie Fernandez MD    Anesthesia Type: general ASA Status: 4            Anesthesia Type: general    Vitals Value Taken Time   /72 04/29/24 1428   Temp 97.1 °F (36.2 °C) 04/29/24 1415   Pulse 85 04/29/24 1428   Resp 19 04/29/24 1428   SpO2 99 04/29/24 1428       Patient Location: ICU    Anesthesia Type: general    Airway Patency: patent    Postop Pain Control: adequate    Mental Status: mildly sedated but able to meaningfully participate in the post-anesthesia evaluation    Nausea/Vomiting: none    Cardiopulmonary/Hydration status: stable euvolemic    Complications: no apparent anesthesia related complications    Postop vital signs: stable    Dental Exam: Unchanged from Preop    Sign out given to ICU staff.

## 2024-04-29 NOTE — ANESTHESIA PROCEDURE NOTES
Arterial Line    Date/Time: 4/29/2024 11:05 AM    Performed by: Robbie Fernandez MD  Authorized by: Robbie Fernandez MD    General Information and Staff    Procedure Start:  4/29/2024 11:05 AM  Procedure End:  4/29/2024 11:05 AM  Anesthesiologist:  Robbie Fernandez MD  Performed By:  Anesthesiologist  Patient Location:  OR  Indication: continuous blood pressure monitoring and blood sampling needed    Site Identification: real time ultrasound guided and image stored and retrievable    Preanesthetic Checklist: 2 patient identifiers, IV checked, risks and benefits discussed, monitors and equipment checked, pre-op evaluation, timeout performed, anesthesia consent and sterile technique used    Procedure Details    Catheter Size:  20 G  Catheter Length:  1 and 3/4 inch  Catheter Type:  Arrow  Seldinger Technique?: Yes    Laterality:  Left  Site:  Radial artery  Site Prep: chlorhexidine    Line Secured:  Wrist Brace, tape and Tegaderm    Assessment    Events: patient tolerated procedure well with no complications      Medications  4/29/2024 11:05 AM      Additional Comments

## 2024-04-30 LAB
ANION GAP SERPL CALC-SCNC: 7 MMOL/L (ref 0–18)
APTT PPP: 30.3 SECONDS (ref 23.3–35.6)
BUN BLD-MCNC: 15 MG/DL (ref 9–23)
CALCIUM BLD-MCNC: 8.6 MG/DL (ref 8.5–10.1)
CHLORIDE SERPL-SCNC: 103 MMOL/L (ref 98–112)
CO2 SERPL-SCNC: 25 MMOL/L (ref 21–32)
CREAT BLD-MCNC: 0.75 MG/DL
EGFRCR SERPLBLD CKD-EPI 2021: 91 ML/MIN/1.73M2 (ref 60–?)
ERYTHROCYTE [DISTWIDTH] IN BLOOD BY AUTOMATED COUNT: 12.9 %
GLUCOSE BLD-MCNC: 120 MG/DL (ref 70–99)
HCT VFR BLD AUTO: 34.7 %
HGB BLD-MCNC: 12.1 G/DL
INR BLD: 1.09 (ref 0.8–1.2)
MCH RBC QN AUTO: 31 PG (ref 26–34)
MCHC RBC AUTO-ENTMCNC: 34.9 G/DL (ref 31–37)
MCV RBC AUTO: 89 FL
OSMOLALITY SERPL CALC.SUM OF ELEC: 282 MOSM/KG (ref 275–295)
PLATELET # BLD AUTO: 81 10(3)UL (ref 150–450)
POTASSIUM SERPL-SCNC: 3.9 MMOL/L (ref 3.5–5.1)
PROTHROMBIN TIME: 14.2 SECONDS (ref 11.6–14.8)
RBC # BLD AUTO: 3.9 X10(6)UL
SODIUM SERPL-SCNC: 135 MMOL/L (ref 136–145)
WBC # BLD AUTO: 11.5 X10(3) UL (ref 4–11)

## 2024-04-30 PROCEDURE — 85610 PROTHROMBIN TIME: CPT | Performed by: SURGERY

## 2024-04-30 PROCEDURE — 85027 COMPLETE CBC AUTOMATED: CPT | Performed by: SURGERY

## 2024-04-30 PROCEDURE — 80048 BASIC METABOLIC PNL TOTAL CA: CPT | Performed by: SURGERY

## 2024-04-30 PROCEDURE — 97116 GAIT TRAINING THERAPY: CPT

## 2024-04-30 PROCEDURE — 85730 THROMBOPLASTIN TIME PARTIAL: CPT | Performed by: SURGERY

## 2024-04-30 PROCEDURE — 97162 PT EVAL MOD COMPLEX 30 MIN: CPT

## 2024-04-30 RX ORDER — CLOPIDOGREL BISULFATE 75 MG/1
75 TABLET ORAL DAILY
Status: DISCONTINUED | OUTPATIENT
Start: 2024-04-30 | End: 2024-05-02

## 2024-04-30 RX ORDER — DOCUSATE SODIUM 100 MG/1
100 CAPSULE, LIQUID FILLED ORAL 2 TIMES DAILY
Status: DISCONTINUED | OUTPATIENT
Start: 2024-04-30 | End: 2024-05-02

## 2024-04-30 RX ORDER — POLYETHYLENE GLYCOL 3350 17 G/17G
17 POWDER, FOR SOLUTION ORAL DAILY PRN
Status: DISCONTINUED | OUTPATIENT
Start: 2024-04-30 | End: 2024-05-02

## 2024-04-30 RX ORDER — ENEMA 19; 7 G/133ML; G/133ML
1 ENEMA RECTAL ONCE AS NEEDED
Status: DISCONTINUED | OUTPATIENT
Start: 2024-04-30 | End: 2024-05-02

## 2024-04-30 RX ORDER — BISACODYL 10 MG
10 SUPPOSITORY, RECTAL RECTAL
Status: DISCONTINUED | OUTPATIENT
Start: 2024-04-30 | End: 2024-05-02

## 2024-04-30 NOTE — CM/SW NOTE
04/30/24 1700   CM/SW Referral Data   Referral Source    Reason for Referral Discharge planning     HOME SITUATION  Type of Home: House   Home Layout: Two level (able to live on the main level)     Lives With: Spouse  Drives: Yes  Patient Owned Equipment: Rolling walker  Patient Regularly Uses: Glasses     Prior Level of [place]: Pt reports normally indpe with ADLs and ambulation, enjoys car related things, has DME at home from his spouse having a hx of falling down the basement stairs after tripping over the cat and breaking multiple bones , states would be able to stay on the main level at dc but shower is upstairs     HH referral sent via [employer]. Will need to follow up with choice. Orders and F2F complete.     Catracho Pandey, CARMEN RN, CM  [phone]

## 2024-04-30 NOTE — PHYSICAL THERAPY NOTE
PHYSICAL THERAPY EVALUATION - INPATIENT     Room Number: 6611/6611-A  Evaluation Date: 4/30/2024  Type of Evaluation: Initial  Physician Order: PT Eval and Treat    Presenting Problem: S/p Fenestrated endovascular abdominal aortic aneurysm repair 4/29  Co-Morbidities : smoking, COPD, just renal abdominal aortic aneurysm, glaucoma, peripheral vascular disease  Reason for Therapy: Mobility Dysfunction and Discharge Planning    PHYSICAL THERAPY ASSESSMENT   Patient is currently functioning below baseline with bed mobility, transfers, gait, and stair negotiation.  Prior to admission, patient's baseline is indep.  Patient is requiring contact guard assist as a result of the following impairments: decreased functional strength, decreased muscular endurance, and nausea with intermittent dry heaving throughout session not correlated with BP .  Resulted in minimal ambulation tolerance this session. Additionally pt demonstrating shaky gait mechanics with recommendation for use of RW and gait belt with staff.  Physical Therapy will continue to follow for duration of hospitalization.    Patient will benefit from continued skilled PT Services at discharge to promote functional independence in home.  Anticipate patient will return home with home health PT. Pt reported able to stay on the main level if necessary.     PLAN  PT Treatment Plan: Bed mobility;Body mechanics;Coordination;Endurance;Energy conservation;Patient education;Family education;Gait training;Neuromuscular re-educate;Range of motion;Strengthening;Stoop training;Stair training;Transfer training;Balance training  Rehab Potential : Good  Frequency (Obs): 3-5x/week  Number of Visits to Meet Established Goals: 5      CURRENT GOALS    Goal #1 Patient is able to demonstrate supine - sit EOB @ level: supervision     Goal #2 Patient is able to demonstrate transfers EOB to/from Chair/Wheelchair at assistance level: supervision     Goal #3 Patient is able to ambulate  150 feet with assist device: walker - rolling at assistance level: supervision     Goal #4 Patient is able to navigate stairs with rail and SBA   Goal #5    Goal #6    Goal Comments: Goals established on 4/30/2024      PHYSICAL THERAPY MEDICAL/SOCIAL HISTORY  History related to current admission: Patient is a 78 year old male admitted on 4/29/2024: Presented for planned procedure -S/p Fenestrated endovascular abdominal aortic aneurysm repair 4/29      HOME SITUATION  Type of Home: House   Home Layout: Two level (able to live on the main level)                Lives With: Spouse  Drives: Yes  Patient Owned Equipment: Rolling walker  Patient Regularly Uses: Glasses    Prior Level of [place]: Pt reports normally indpe with ADLs and ambulation, enjoys car related things, has DME at home from his spouse having a hx of falling down the basement stairs after tripping over the cat and breaking multiple bones , states would be able to stay on the main level at dc but shower is upstairs     SUBJECTIVE  \" I feel very weak, my legs are super heavy\"       OBJECTIVE  Precautions: None  Fall Risk: High fall risk    WEIGHT BEARING RESTRICTION  Weight Bearing Restriction: None                PAIN ASSESSMENT  Rating: Unable to rate  Location: abdomen  Management Techniques: Activity promotion;Body mechanics;Repositioning    COGNITION  Overall Cognitive Status:  WFL - within functional limits    RANGE OF MOTION AND STRENGTH ASSESSMENT  Upper extremity ROM and strength are within functional limits     Lower extremity ROM is within functional limits     Lower extremity strength is within functional limits       BALANCE  Static Sitting: Good  Dynamic Sitting: Good  Static Standing: Fair -  Dynamic Standing: Fair -    ADDITIONAL TESTS                                    ACTIVITY TOLERANCE                         O2 WALK       NEUROLOGICAL FINDINGS                        AM-PAC '6-Clicks' INPATIENT SHORT FORM - BASIC MOBILITY  How much  difficulty does the patient currently have...  Patient Difficulty: Turning over in bed (including adjusting bedclothes, sheets and blankets)?: None   Patient Difficulty: Sitting down on and standing up from a chair with arms (e.g., wheelchair, bedside commode, etc.): A Little   Patient Difficulty: Moving from lying on back to sitting on the side of the bed?: None   How much help from another person does the patient currently need...   Help from Another: Moving to and from a bed to a chair (including a wheelchair)?: A Little   Help from Another: Need to walk in hospital room?: A Little   Help from Another: Climbing 3-5 steps with a railing?: A Little       AM-PAC Score:  Raw Score: 20   Approx Degree of Impairment: 35.83%   Standardized Score (AM-PAC Scale): 47.67   CMS Modifier (G-Code): CJ    FUNCTIONAL ABILITY STATUS  Gait Assessment   Functional Mobility/Gait Assessment  Gait Assistance: Contact guard assist  Distance (ft): 15,15  Assistive Device: Rolling walker  Pattern: Shuffle    Skilled Therapy Provided     Bed Mobility:  Rolling: mod I   Supine to sit: min A - very minimal assist provided for advancement of BLE off edge of bed      Transfer Mobility:  Sit to stand: CGA   Stand to sit: CGA  Gait = CGA - shaky, shuffle pattern noted, pt appeared to be very reliant on RW    Therapist's Comments: performed ankle pumps, heel slides, SAQ in supine and LAQ sitting EOB, strength appeared symmetrical, pt reporting inc nausea after transfer EOB with BP assessed and noted at 127/63 which was comparable to previous readings. RN able to provide medication. Pt reported minimal relief throughout session requesting to walk to bathroom. Performed with CGA and gait training with RW. Dry heaved again while sitting on toilet. After break able to ambulate back to bedside chair with BP reassessed at 118/75.     Exercise/Education Provided:  Functional activity tolerated  Gait training    Patient End of Session: Up in chair;Needs  met;Call light within reach;RN aware of session/findings;All patient questions and concerns addressed      Patient Evaluation Complexity Level:  History Moderate - 1 or 2 personal factors and/or co-morbidities   Examination of body systems Moderate - addressing a total of 3 or more elements   Clinical Presentation Moderate - Evolving   Clinical Decision Making Moderate - Evolving       PT Session Time: 20 minutes  Gait Training: 10 minutes  Therapeutic Activity:  minutes  Neuromuscular Re-education:  minutes  Therapeutic Exercise:  minutes

## 2024-04-30 NOTE — PROGRESS NOTES
Sore after surgery -appropriate   Groins soft     Ambulate   Physical therapy   Transfer to floor

## 2024-04-30 NOTE — PLAN OF CARE
Pt sitting up at 1815, complaints of nausea, nausea given x2 and reglan per emar. Pt complaints of intermittent dizziness, post sitting up at 30 degrees in bed. Endorsed to night shift nurse during report. Pt denies being in any post sitting up. Pt with right groin with minor bruising, but soft, no hematoma, and Quick clot and tegaderm in place. Left groin GRACIELA, c/d/I, no hematoma, soft. Pedal pulses per doppler. Pt remains alert and following commands. Radial A-line with 20 number difference to systolic blood pressure on cuff. Pt remains in bed. Call light within reach. Plan of care ongoing. Report provided to Jose Maria RN and LEISA RN.

## 2024-04-30 NOTE — OPERATIVE REPORT
Vascular Surgery Procedure Note  Harpreet Lee  [id number]  9/18/1945         Date:04/14/2024        Procedure: Physician Planning of a patient specific fenestrated visceral aortic graft requiring a minimum of 90 minutes of physician time. CPT 28000     78 year old male with numerous comorbidities with large type IV thorcoabdominal aortic aneurysm. He underwent preoperative planning that was extensive and revealed that a conventional Zenith fenestrated custom-made graft would not be an option for him. As such, a physician modified fenestrated endovascular aortic repair was recommended.     After review of CT scan, detailed three-dimensional reconstruction was performed with [employer] it was determined that the patient would not have adequate seal zone without extending above the celiac artery.  Therefore measurements were performed to create fenestrations for the celiac, SMA, and renal arteries.  The measurements were recorded with the plan to perform on 04/29/2024. Over 90 minutes was used in measuring and creating the endograft for this patient. See operative dictation for procedure details.      Facundo Horne MD  [facility]  Vascular Surgery

## 2024-04-30 NOTE — PLAN OF CARE
Pt received aox4, sitting up in chair. VSS, NSR w/ occassional PVCs, normotensive. R IJ CVC noted to be bleeding and oozing; paged Dr Martin, ok to remove. Pt transported back to bed for removal and remained flat 30 minutes post removal. Bilateral groin sites soft, no hematoma, and doppler pedal pulses. Bruising noted on R groin. Worked with PT; noted gait was shaky and unsteady when ambulating in room, can ambulate with 1 staff/contact guard/walker. Nausea reported; zofran given. Norco and dilaudid given for abdominal pain.Pt having difficulty voiding, BS q6 if no urine output, able to void at 1400.     1030 Dr Martin rounded; pt ok to transfer to floor.     Problem: RESPIRATORY - ADULT  Goal: Achieves optimal ventilation and oxygenation  Description: INTERVENTIONS:  - Assess for changes in respiratory status  - Assess for changes in mentation and behavior  - Position to facilitate oxygenation and minimize respiratory effort  - Oxygen supplementation based on oxygen saturation or ABGs  - Provide Smoking Cessation handout, if applicable  - Encourage broncho-pulmonary hygiene including cough, deep breathe, Incentive Spirometry  - Assess the need for suctioning and perform as needed  - Assess and instruct to report SOB or any respiratory difficulty  - Respiratory Therapy support as indicated  - Manage/alleviate anxiety  - Monitor for signs/symptoms of CO2 retention  Outcome: Progressing     Problem: GASTROINTESTINAL - ADULT  Goal: Minimal or absence of nausea and vomiting  Description: INTERVENTIONS:  - Maintain adequate hydration with IV or PO as ordered and tolerated  - Nasogastric tube to low intermittent suction as ordered  - Evaluate effectiveness of ordered antiemetic medications  - Provide nonpharmacologic comfort measures as appropriate  - Advance diet as tolerated, if ordered  - Obtain nutritional consult as needed  - Evaluate fluid balance  Outcome: Progressing  Goal: Maintains or returns to baseline bowel  function  Description: INTERVENTIONS:  - Assess bowel function  - Maintain adequate hydration with IV or PO as ordered and tolerated  - Evaluate effectiveness of GI medications  - Encourage mobilization and activity  - Obtain nutritional consult as needed  - Establish a toileting routine/schedule  - Consider collaborating with pharmacy to review patient's medication profile  Outcome: Progressing     Problem: GENITOURINARY - ADULT  Goal: Absence of urinary retention  Description: INTERVENTIONS:  - Assess patient’s ability to void and empty bladder  - Monitor intake/output and perform bladder scan as needed  - Follow urinary retention protocol/standard of care  - Consider collaborating with pharmacy to review patient's medication profile  - Implement strategies to promote bladder emptying  Outcome: Progressing     Problem: METABOLIC/FLUID AND ELECTROLYTES - ADULT  Goal: Hemodynamic stability and optimal renal function maintained  Description: INTERVENTIONS:  - Monitor labs and assess for signs and symptoms of volume excess or deficit  - Monitor intake, output and patient weight  - Monitor urine specific gravity, serum osmolarity and serum sodium as indicated or ordered  - Monitor response to interventions for patient's volume status, including labs, urine output, blood pressure (other measures as available)  - Encourage oral intake as appropriate  - Instruct patient on fluid and nutrition restrictions as appropriate  Outcome: Progressing     Problem: HEMATOLOGIC - ADULT  Goal: Maintains hematologic stability  Description: INTERVENTIONS  - Assess for signs and symptoms of bleeding or hemorrhage  - Monitor labs and vital signs for trends  - Administer supportive blood products/factors, fluids and medications as ordered and appropriate  - Administer supportive blood products/factors as ordered and appropriate  Outcome: Progressing  Goal: Free from bleeding injury  Description: (Example usage: patient with low  platelets)  INTERVENTIONS:  - Avoid intramuscular injections, enemas and rectal medication administration  - Ensure safe mobilization of patient  - Hold pressure on venipuncture sites to achieve adequate hemostasis  - Assess for signs and symptoms of internal bleeding  - Monitor lab trends  - Patient is to report abnormal signs of bleeding to staff  - Avoid use of toothpicks and dental floss  - Use electric shaver for shaving  - Use soft bristle tooth brush  - Limit straining and forceful nose blowing  Outcome: Progressing

## 2024-04-30 NOTE — PLAN OF CARE
Assumed patient care at 1930. A&O x4. VSS.   R groin minor bruising, soft, no hematoma.   L groin soft, no hematoma   Intermittent nausea managed w/Prn meds.  Lower back pain managed w/ PRN meds   Pt up in chair, tolerated well.   Patient updated with plan of care.

## 2024-04-30 NOTE — OPERATIVE REPORT
[facility]     PATIENT'S NAME:   Harpreet Lee    ATTENDING PHYSICIAN: Facundo Horne MD   OPERATING PHYSICIAN: Facundo Horne MD      MEDICAL RECORD #:[id number]    YOB: 1945        OPERATION DATE:  04/29/2024     OPERATIVE REPORT     PREOPERATIVE DIAGNOSIS:    1.       Type IV  Thoracoabdominal aortic aneurysm         POSTOPERATIVE DIAGNOSIS:    1.       Type IV  Thoracoabdominal aortic aneurysm      PROCEDURE:    1.       Ultrasound-guided access of the bilateral common femoral arteries.     2.       Aortogram with radiologic supervision and interpretation.     3.       Intravascular ultrasound of the left external iliac, left common iliac artery, Right common iliac artery, right external iliac artery, and  aorta with radiologic supervision and interpretation.                      4.  Selective catheterization to first order branches of the aorta ( Celiac, SMA, right renal, left renal) with associated angiogram (with radiologic supervision and interpretation).                     5.  Thoracic Endovascular Aortic Repair CPT 85469 with Cook Zdeg [id number]                     6. Fenestrated endovascular aortic repair with placement with 4 fenestrations (Celiac, SMA, left renal, right renal) CPT 10004  Celiac 8x29 VBX  SMA 8x29 VBX    Left Renal 6x29 VBX  Right Renal 6x29 VBX      [place] Excluder 36mm    27mm left ,   Left 24mm right                                                      7. Closure of Bilateral Groins with Proglide Perclose Suture (18 Fr Right, 22 Fr Left).                      CO-SURGEONS: MD Warren Galdamez M.D.  Due to the complexity of the case, I was asked to act as a co-surgeon for this operation.      ANESTHESIA:  General.      ESTIMATED BLOOD LOSS:  200 mL.        Complications: None     Drains: None     Findings: No endoleak with preservation of flow in all visceral vessels. Moving all extremities at completion with doppler in the bilateral lower  extremities.     Disposition: Extubated and taken to the ICU in stable condition.  She will lay flat and be monitored closely.       Indications for procedure: This is an 78 year-old male who had a known large paravisceral aneurysm.  My partner informed him that he would require a type IV thoracoabdominal aortic aneurysm repair.  My partner informed him that there is not a device that is commercially available in the [place] that would be suitable with sealing this and thus a physician modified repair was recommended.  We discussed the risks and benefits of the procedure not limited to risk of bleeding, infection, stroke, myocardial infarction, renal insufficiency, spinal cord ischemia, death. She affirmed understanding and strongly desired to proceed.      On the morning of 04/29, the patient was brought into the operating room and placed in supine position.  General anesthesia was induced without any issues.  A Lockhart catheter  and arterial line were placed in sterile fashion without any issues. Ancef was given for surgical antimicrobial prophylaxis.  The patient was subsequently prepped and draped in the usual sterile fashion.  Timeout was performed.     Prior to initiation, I performed the back table fenestrated graft creation. Based on preoperative measurements on [place], 4 fenestrations were placed on the graft with ophthalmic cautery.  A micropuncture wire was used to suture and reinforce each fenestration with 4-0 Ethibond suture.  Diameter reducing ties were placed circumferrentially around each z stent with 4-0 chromic suture, which was used to reduce the diameter by greater than 50%.  The graft was then re-constrained with an umbilical tape and resheathed.  The graft was then ready for use.       With the use of ultrasound, the femoral bifucation was identified over the femoral head. Dr Martin utilized a micropuncture to access the left common femoral artery  with placement of a micro wire and  exchanged for a J wire and subsequent 6 Croatian sheath. I performed this on the right. This was then predilated with 8 Croatian sheath and then to pro-glide Perclose sutures were placed in the 10 and 2 o'clock position in standard fashion bilaterally with placement of 8 Croatian sheaths bilaterally. He performed this on the left  and I performed this on the right. In the left groin he exchanged for a Lunderquist wire and then performed intravascular ultrasound of left common iliac, left external iliac, and aorta that was used to determine and verify the locations of the celiac,  SMA and bilateral renal arteries. He confirmed under fluoroscopy proper orientation of the graft.  In the right groin we then predilated the tract  and advanced the device with 16 Croatian sheath and advanced this in the descending thoracic aorta above the celiac artery without any issues. I then unsheathed it in to the descending thoracic aorta extending to the visceral aorta. I then selected the graft and then advanced an 18 Fr sheath up the right groin into the device.      While the graft was constrained, I exchanged for a tourguide and a glidewire, I was able to select the SMA, confirmed cannulation via contrast angiography with placement of a Magic torque wire. He then used the tourguide with Glidewire and quick cross catheter to select the left renal fenestration and was able to advance this with some manipulation into the left renal  and confirmed this via contrast angiography. A magic torque was put in place.  I then selected the right renal  and Glidewire followed by placement of a Magic torque wire. He then selected the celiac artery and placed a magic torque wire. This was followed by placement of a 6 Croatian Ansell sheath and 6x29 VBX in the renal arteries bilaterally.        I deployed the graft without any issues and exchanged for a MOAB balloon. Dr Martin then advanced the ballon was then I inflated the balloon thereby fracturing the  diameter reducing and fully deploying the graft. This was performed with out issue. Dr Martin  then withdrew the sheath partially in the right renal and then I deployed the right renal stent without issue. This was then post dilated with a [id number] balloon. Contrast injection revealed wide patency without any leak. The sheath and wire were removed.  Dr. Martin the withdrew the sheath in the left renal. He then withdrew the sheath and then I deployed the stent without any issues.  This was flared with a 10 x 2 balloon and a repeat angiogram revealed wide patency with no leak.  The wire and sheath were removed.   Dr. Martin then placed the tourguide in the SMA followed by 8x29 VBX. I deployed this without any issues and then flared the proximal segment with a 10 mm balloon.  Repeat contrast angiography revealed no further residual stenosis and wide patency and complete seal of the segment.  All wires and catheters were withdrawn from this. In the Celiac, I advanced the  sheath through this into the celiac. I advanced a 8x29 VBX in place. This was then deployed without issues by Dr. Martin and flaired with a 10mm balloon. Repeat contrast angiography revealed wide patency and complete seal of the segment.  All wires and catheters were withdrawn from this.     I  then advanced the [place] excluder up the right groin to just below the right  renal fenestration with exposure of the contralateral gate. Dr. Martin then utilized a Kumpe catheter and Glidewire advantage to select the gate which was exchanged for an ahs wire.  Intravascular ultrasound was advanced from the left external iliac into the left common iliac into the aorta confirming cannulation of the gate.  The remainder of the device on the left was deployed without any issues.  27mm iliac extension pieces  was deployed in the distal left common iliac without issue. I then exchagned for the IVUS in the right common iliac, right external iliac artery and  aorta. I confirmed location of the distal right common iliac and bifurcation. I deployed a 24mm limb that require additional extension.     Dr. Martin then exchanged for a pigtail catheter up the left groin.  An aortogram and pelvic angiogram revealed wide patency of the visceral vessels and with no endoleak with wide patency into the iliac limbs. At this point we were satisfied and opted to terminate the procedure. All catheters were removed.  The pro-glide Perclose sutures were then cinched locked and cut thereby closing the arteriotomy sites. Manual pressure was held.  Protamine was used for reversal of heparinization.  Upon completion of this there is evidence of excellent hemostasis. Dermabond was applied over the puncture sies. Doppler signals were confirmed in the bilateral lower extremities consistent with preoperative baseline.  The patient awoke from general anesthesia was extubated, moving all extermities, and taken to the ICU in stable condition. All counts were correct at the end of the case.       Facundo Horne MD

## 2024-05-01 ENCOUNTER — APPOINTMENT (OUTPATIENT)
Dept: CT IMAGING | Facility: HOSPITAL | Age: 79
End: 2024-05-01
Attending: SURGERY
Payer: COMMERCIAL

## 2024-05-01 LAB
BILIRUB UR QL STRIP.AUTO: NEGATIVE
CLARITY UR REFRACT.AUTO: CLEAR
COLOR UR AUTO: COLORLESS
CREAT BLD-MCNC: 0.75 MG/DL
EGFRCR SERPLBLD CKD-EPI 2021: 91 ML/MIN/1.73M2 (ref 60–?)
GLUCOSE UR STRIP.AUTO-MCNC: NORMAL MG/DL
HGB BLD-MCNC: 11.8 G/DL
KETONES UR STRIP.AUTO-MCNC: 10 MG/DL
LEUKOCYTE ESTERASE UR QL STRIP.AUTO: 25
NITRITE UR QL STRIP.AUTO: NEGATIVE
PH UR STRIP.AUTO: 6.5 [PH] (ref 5–8)
PROT UR STRIP.AUTO-MCNC: NEGATIVE MG/DL
SP GR UR STRIP.AUTO: <1.005 (ref 1–1.03)
UROBILINOGEN UR STRIP.AUTO-MCNC: NORMAL MG/DL

## 2024-05-01 PROCEDURE — 82565 ASSAY OF CREATININE: CPT | Performed by: SURGERY

## 2024-05-01 PROCEDURE — 87086 URINE CULTURE/COLONY COUNT: CPT | Performed by: HOSPITALIST

## 2024-05-01 PROCEDURE — 85018 HEMOGLOBIN: CPT | Performed by: HOSPITALIST

## 2024-05-01 PROCEDURE — 74174 CTA ABD&PLVS W/CONTRAST: CPT | Performed by: SURGERY

## 2024-05-01 PROCEDURE — 81001 URINALYSIS AUTO W/SCOPE: CPT | Performed by: HOSPITALIST

## 2024-05-01 RX ORDER — HYDROCODONE BITARTRATE AND ACETAMINOPHEN 5; 325 MG/1; MG/1
1 TABLET ORAL EVERY 4 HOURS PRN
Qty: 30 TABLET | Refills: 0 | Status: SHIPPED | OUTPATIENT
Start: 2024-05-01

## 2024-05-01 RX ORDER — BISACODYL 10 MG
10 SUPPOSITORY, RECTAL RECTAL ONCE
Status: DISCONTINUED | OUTPATIENT
Start: 2024-05-01 | End: 2024-05-02

## 2024-05-01 RX ORDER — IOHEXOL 350 MG/ML
81 INJECTION, SOLUTION INTRAVENOUS
Status: COMPLETED | OUTPATIENT
Start: 2024-05-01 | End: 2024-05-01

## 2024-05-01 RX ORDER — CLOPIDOGREL BISULFATE 75 MG/1
75 TABLET ORAL DAILY
Qty: 90 TABLET | Refills: 0 | Status: SHIPPED | OUTPATIENT
Start: 2024-05-02 | End: 2024-07-31

## 2024-05-01 NOTE — DISCHARGE INSTRUCTIONS
Can shower  Take aspirin and clopidogrel daily  Resume previous medications     You may shower with soap and water starting on 05/02/2024 .  No soaking in bath or pool for 2 weeks.    You should resume all home medications as previously.    Take tylenol as needed for pain.    You will need to take stool softeners to prevent constipation..    No heavy lifting or straining for 2 weeks. You may then resume to normal activity.    Drink plenty of fluids to stay well hydrated.     You will need to followup  in the vascular clinic in 3 weeks. Please call [phone] to make an appt.    If you have any fevers, chills, chest pain, shortness of breath, drainage, swelling or bleeding, please call the office in order to return to clinic sooner for evaluation.

## 2024-05-01 NOTE — PLAN OF CARE
Assumed care at 0730  Alert and oriented x4, forgetful  Weaned to 1L NC. NSR on tele. VSS.   Prn norco for pain in abd and lower back. Comes and goes per pt.   Bowel regimen given. Pt declined suppository  R groin gauze/tape w/ bruising. L groin GRACIELA w/ light bruising  Voiding up in bathroom and in urinal. Frequent urination  Right IJ dressing taken off but site bleeding afterwards. Manual pressure held and quick clot placed. Dressing saturated after 15 minutes and manual pressure held again for 20 min with another quick clot placed. Dressing c/d/I and bleeding stopped  Up 1 w/ walker  CT abd/pelvis ordered for abd pain  Wife at bedside. Updated on POC  Safety precautions in place

## 2024-05-01 NOTE — OPERATIVE REPORT
[facility]    PATIENT'S NAME: ALBA FIELD   ATTENDING PHYSICIAN: Warren Martin M.D.   OPERATING PHYSICIAN: Warren Martin M.D.   PATIENT ACCOUNT#:   [id number]    LOCATION:  [address] [place] [address]  MEDICAL RECORD #:   [id number]       YOB: 1945  ADMISSION DATE:       04/29/2024      OPERATION DATE:  04/29/2024    OPERATIVE REPORT     PREOPERATIVE DIAGNOSIS:  Type 4 thoracoabdominal aortic aneurysm.  POSTOPERATIVE DIAGNOSIS:  Type 4 thoracoabdominal aortic aneurysm.  PROCEDURE:  1.   Ultrasound-guided percutaneous access of bilateral common femoral arteries.  2.   Aortogram with radiologic supervision and interpretation.  3.   Intravascular ultrasound of the left external iliac, left common iliac, right common iliac, right external iliac, and aorta with radiologic supervision and interpretation.  4.   Selective catheterization of first order branch of the aorta, celiac, superior mesenteric artery, right renal, left renal.  5.   Thoracic endovascular aortic repair, CPT 58642 with Cook ZDEG 38 x 38 x 199.  6.   Fenestrated endovascular aortic repair with placement of 4 fenestrations, celiac, superior mesenteric artery, left renal, and right renal, CPT 59357; celiac 8 x 29 VBX, superior mesenteric artery 8 x 29 VBX, left renal 6 x 29 VBX, right renal 6 x 29 VBX; [place] Excluder 36 mm, 27 mm left limb, right 24 mm.  7.   Closure of bilateral groins with Perclose preclose technique, 18-Swazi right, 22-Swazi left.    CO-SURGEONS:  Warren Martin MD and Facundo Horne MD.  Due to the complexity of the case and the patient's significant comorbidities, I requested Dr. Horne to participate as co-surgeon.    ANESTHESIA:  General.    ESTIMATED BLOOD LOSS:  200 mL.    COMPLICATIONS:  None.    DRAINS:  None.    DISPOSITION:  Patient was extubated and taken to the ICU in stable condition.    INDICATIONS:  This is an 78-year-old male with a known large paravisceral aneurysm.  We discussed  that he would require a type 4 thoracoabdominal aortic aneurysm repair.  We are proceeding as described below.    FINDINGS:  No endoleak with preservation of flow in all visceral vessels.    OPERATIVE TECHNIQUE:  The patient was taken to the operating room, placed under general anesthesia.  Patient was prepped and draped in the usual sterile fashion.  Time-out performed.  Prior to the initiation, we performed a back table fenestrated graft creation based on preoperative measurements on the center line; 4 fenestrations were placed on the graft with ophthalmic cautery.  Micropuncture wire was used to suture and reinforce each fenestration with 4-0 Ethibond suture.  Diameter-reducing ties were placed circumferentially around each Z stent with 4-0 chromic suture which was used to reduce the diameter by greater than 50%.  Graft was then re-constrained with an umbilical tape and re-sheathed.  Graft was then ready for use.  Using ultrasound, we then percutaneously accessed the femoral artery on each side.  I percutaneously accessed the left.  Dr. Horne percutaneously accessed the right.  Simultaneously, we then advanced J-wires into the aorta, exchanged the micropuncture sheaths for 6-Iraqi sheaths, then placed 2 Perclose devices in preclose technique and then placed two 8-Iraqi sheaths, 1 on the left and 1 on the right.  The patient was then systemically heparinized.  On the patient's left, I then advanced a Glidewire Advantage and a Kumpe to the level of thoracic aorta and exchanged for a Lunderquist wire.  I then advanced an 0.035 IVUS catheter and performed intravascular ultrasound of the left external iliac, left common iliac, and aorta to determine and verify the location of the celiac SMA and bilateral renal arteries.  We confirmed under fluoroscopy proper orientation of the graft on the right.  I then predilated a tract, advanced device with a 16-Iraqi sheath and advanced this in the descending thoracic aorta  above the celiac artery without any issues.  We then unsheathed it in the descending thoracic aorta.  Dr. Horne then selected the graft and then advanced an 18-Estonian sheath up the right groin into the device.  With the graft still preconstrained, Dr. Horne exchanged for a TourGuide and Glidewire and was able to select the superior mesenteric artery confirmed with cannulation via contrast angiogram with placement of Magic Torque wire.  I then used a TourGuide with a Glidewire and a Quick-Cross catheter to select the left renal fenestration.  I was able to advance this with some manipulation to the left renal and confirmed this via contrast angiogram.  Magic Torque was put in place.  Dr. Horne then selected the right renal artery followed by a Magic Torque placement.  I then selected the celiac artery and placed a Magic Torque wire.  This was followed by placement of a 6-Estonian [place] and a 6-Estonian 6 x 29 VBX in the renal arteries bilaterally.  We then completed the deployment of the graft without any issues.  I then advanced the balloon and Dr. Horne inflated the balloon to fracture the diameter-reducing ties.  This was performed without issue.  We then began stenting the renal arteries.  I partially withdrew the sheath from the right renal artery and deployed the right renal stent without any issue.  We then postdilated this with a 10 x 2 balloon.  Contrast angiogram revealed wide patency without any evidence of an endoleak.  Sheath and wire were removed.  I then proceeded with stenting the left renal fenestration.  I partially withdrew the sheath and Dr. Horne deployed the stent without any issue.  We then used a 10 x 2 flaring balloon and repeat angiogram was performed and demonstrated no evidence of an endoleak.  Wire and sheath were removed.  I then placed a TourGuide sheath from the superior mesenteric artery followed by an 8 x 29 VBX.  We deployed this without any issue and inflated the  proximal segment with a 10 mm balloon.  Repeat contrast angiogram revealed no further residual stenosis and wide patency and complete seal of the segment.  All wires and catheters were withdrawn from this.  In the celiac artery, we then advanced a TourGuide sheath through this into the celiac artery.  Dr. oHrne then advanced an 8 x 29 VBX.  This was then deployed without any issues by myself and flared with a 10 x 2 balloon.  Repeat contrast angiogram revealed wide patency and complete seal of the segment.  All wires and catheters were withdrawn from this.  From the right, we  then exchanged for a [place] Excluder device and the [place] Excluder main body was deployed below the right renal fenestration with exposure of the contralateral gate.  I then utilized a Kumpe catheter and a Glidewire to select the gate which was exchanged for a Pilar wire.  Intravascular ultrasound was advanced into the left external iliac, into the left common iliac, and into the aorta confirming cannulation of the gate.  The remainder of the device on the left was deployed without any issue.  A 27 mm iliac extension was deployed in the distal left common iliac artery without any issues.  Dr. Horne then exchanged for the IVUS catheter in the right common iliac, right external iliac, and aorta.  We then confirmed the location in the distal right common iliac and the iliac bifurcation.  He then deployed a 24 mm limb that required additional extension to provide adequate seal within an additional overlapping 24 mm piece.  We then profiled the entire device below the renal arteries with the MOB balloon.  We then exchanged for a pigtail catheter up the left groin.  Aortogram and pelvic angiogram revealed wide patency of the visceral vessels with no evidence of an endoleak.  At this point, we were satisfied and opted to terminate the procedure.  All catheters and wires were removed and the Perclose devices were secured.  Heparin was reversed with  protamine.  Upon completion, there was evidence of excellent hemostasis.  Doppler signals were confirmed in bilateral lower extremities.  The patient was awakened from anesthesia and taken to Recovery in stable condition.    Dictated By Warren Martin M.D.  d: 04/30/2024 12:09:43  t: 04/30/2024 17:37:55  [facility] [id number]  MJG/

## 2024-05-01 NOTE — PROGRESS NOTES
[facility]   part of [facility]     Hospitalist Progress Note     Harpreet Lee Patient Status:  Inpatient    1945 MRN [id number]   Location [facility] 6NE-A Attending Warren Martin MD   Hosp Day # 1 PCP Delmis Barnard MD     Chief Complaint: post op EVAR    Subjective:     Patient doing well still complaining some pain in the abdomen, has not voided or had a bowel movement yet, central venous catheter has been removed patient okay to be transferred to the floor    Objective:    Review of Systems:   A comprehensive review of systems was completed; pertinent positive and negatives stated in subjective.    Vital signs:  Temp:  [96.9 °F (36.1 °C)-99 °F (37.2 °C)] 99 °F (37.2 °C)  Pulse:  [69-89] 83  Resp:  [8-26] 26  BP: (116-147)/() 143/92  SpO2:  [91 %-100 %] 97 %  AO: (149-157)/(70-81) 152/78    Physical Exam:        Gen: No acute distress, alert and oriented x 3 however moaning and groaning and sleepy  Pulm: Lungs clear bilaterally, good inspiratory effort   CV:  nL S1/S2  Abd: soft, NT/ND, no hepatomegaly, +BS groin incisions clean dry and intact, right incision slightly oozing with a small hematoma around it-  MSK: moving all extremities, no edema as above  Neuro: no focal deficits  Skin: no rashes/lesions  Psych: normal mood/affect    Diagnostic Data:    Labs:  Recent Labs   Lab 24  0407   WBC  --  11.5*   HGB 12.2* 12.1*   MCV  --  89.0   PLT  --  81.0*   INR  --  1.09       Recent Labs   Lab 24  0407   *   BUN 15   CREATSERUM 0.75   CA 8.6   *   K 3.9      CO2 25.0       Estimated Creatinine Clearance: 77.8 mL/min (based on SCr of 0.75 mg/dL).    No results for input(s): \"TROP\", \"TROPHS\", \"CK\" in the last 168 hours.    Recent Labs   Lab 24  0407   PTP 14.2   INR 1.09                  Microbiology    No results found for this visit on 24.      Imaging: Reviewed in Epic.    Medications:    docusate sodium  100 mg Oral  BID    clopidogrel  75 mg Oral Daily    umeclidinium-vilanterol  1 puff Inhalation Daily    heparin  5,000 Units Subcutaneous Q8H DAYA    nicotine  1 patch Transdermal Daily    Tafluprost (PF)  1 drop Both Eyes Daily       Assessment & Plan:        Patient is an 78-year-old male with signal past medical history of smoking, COPD, just renal abdominal aortic aneurysm, glaucoma, peripheral vascular disease presented status post just renal abdominal aortic aneurysm repair,     # Abdominal aortic aneurysm status post repair  # Fenestrated EVAR  -Postop day 1, pain control, postop antibiotics, fluids per vascular surgery  -Dopplerable pedal pulses bilaterally present  -Check groin for hematoma-appears to be stable  -Pain control  -Patient complains abdominal pain, continue to monitor defer further imaging to vascular surgery  -Transfer to floor     # Smoking history of COPD  #  -Nicotine patch     # Peripheral vascular disease      Subha Sol MD    Supplementary Documentation:     Quality:  DVT Mechanical Prophylaxis:   SCDs,    DVT Pharmacologic Prophylaxis   Medication    heparin (Porcine) 5000 UNIT/ML injection 5,000 Units                Code Status: Full Code  Lockhart: No urinary catheter in place  Lockhart Duration (in days):   Central line:    NARA:   The 21st Century Cures Act makes medical notes like these available to patients in the interest of transparency. Please be advised this is a medical document. Medical documents are intended to carry relevant information, facts as evident, and the clinical opinion of the practitioner. The medical note is intended as peer to peer communication and may appear blunt or direct. It is written in medical language and may contain abbreviations or verbiage that are unfamiliar.                  Name band;

## 2024-05-01 NOTE — PHYSICAL THERAPY NOTE
Attempted to see Pt this PM - RN aware of attempt.  Pt denied mobility concerns prior to discharge - owns RW.   Will f/u later today if time permits, after all other patients are attempted per tentative schedule.

## 2024-05-01 NOTE — PROGRESS NOTES
Report given to CECY Villagomez on CTU7. Nikia, spouse, updated on room number. Addressed orders for daily inhaler and day team to address abdominal x-ray per family request.

## 2024-05-01 NOTE — PROGRESS NOTES
Vascular Surgery Progress Note    No acute events. Patient reports nausea this afternoon and dry heaving. Some ongoing abdominal discomfort. Has not yet had a bowel movement. Refused suppository today. Bilateral groins are soft with expected bruising, no hematoma. Feet warm, well perfused.   79 y/o male s/p FEVAR  - CT scan today   - possible discharge tomorrow pending clinical improvement.     Cathie Moses MD  05/01/24  4:30 PM

## 2024-05-01 NOTE — PROGRESS NOTES
[facility]   part of [facility]     Hospitalist Progress Note     Harpreet Lee Patient Status:  Inpatient    1945 MRN [id number]   Location [facility] 6NE-A Attending Warren Martin MD   Hosp Day # 2 PCP Delmis Barnard MD     Chief Complaint: post op EVAR    Subjective:     Patient doing well still complaining some pain in the abdomen, has not voided or had a bowel movement yet, central venous catheter has been removed patient okay to be transferred to the floor    Objective:    Review of Systems:   A comprehensive review of systems was completed; pertinent positive and negatives stated in subjective.    Vital signs:  Temp:  [97.5 °F (36.4 °C)-99 °F (37.2 °C)] 97.5 °F (36.4 °C)  Pulse:  [] 102  Resp:  [13-26] 18  BP: (121-159)/() 154/91  SpO2:  [82 %-99 %] 82 %    Physical Exam:        Gen: No acute distress, alert and oriented x 3 however moaning and groaning and sleepy  Pulm: Lungs clear bilaterally, good inspiratory effort   CV:  nL S1/S2  Abd: soft, NT/ND, no hepatomegaly, +BS groin incisions clean dry and intact, right incision slightly oozing with a small hematoma around it-  MSK: moving all extremities, no edema as above  Neuro: no focal deficits  Skin: no rashes/lesions  Psych: normal mood/affect    Diagnostic Data:    Labs:  Recent Labs   Lab 24   WBC  --  11.5*   HGB 12.2* 12.1*   MCV  --  89.0   PLT  --  81.0*   INR  --  1.09       Recent Labs   Lab 24  1451   *  --    BUN 15  --    CREATSERUM 0.75 0.75   CA 8.6  --    *  --    K 3.9  --      --    CO2 25.0  --        Estimated Creatinine Clearance: 77.8 mL/min (based on SCr of 0.75 mg/dL).    No results for input(s): \"TROP\", \"TROPHS\", \"CK\" in the last 168 hours.    Recent Labs   Lab 24  0407   PTP 14.2   INR 1.09                  Microbiology    No results found for this visit on 24.      Imaging: Reviewed in Epic.    Medications:     bisacodyl  10 mg Rectal Once    docusate sodium  100 mg Oral BID    clopidogrel  75 mg Oral Daily    umeclidinium-vilanterol  1 puff Inhalation Daily    heparin  5,000 Units Subcutaneous Q8H DAYA    nicotine  1 patch Transdermal Daily    Tafluprost (PF)  1 drop Both Eyes Daily       Assessment & Plan:        Patient is an 78-year-old male with signal past medical history of smoking, COPD, just renal abdominal aortic aneurysm, glaucoma, peripheral vascular disease presented status post just renal abdominal aortic aneurysm repair,     # Abdominal aortic aneurysm status post repair  # Fenestrated EVAR  -Postop day 2, pain control, postop antibiotics, fluids per vascular surgery  -Dopplerable pedal pulses bilaterally present  -Check groin for hematoma-appears to be stable  -Pain control  -Patient complains abdominal pain, continue to monitor defer further imaging to vascular surgery-checking a CT abdomen pelvis today  DC when okay with vascular surgery  -Resume Plavix     # Smoking history of COPD  #  -Nicotine patch     # Peripheral vascular disease  -Resume Plavix    Subha Sol MD    Supplementary Documentation:     Quality:  DVT Mechanical Prophylaxis:   SCDs,    DVT Pharmacologic Prophylaxis   Medication    heparin (Porcine) 5000 UNIT/ML injection 5,000 Units                Code Status: Full Code  Lockhart: No urinary catheter in place  Lockhart Duration (in days):   Central line:    NARA: 5/1/2024  The 21st Century Cures Act makes medical notes like these available to patients in the interest of transparency. Please be advised this is a medical document. Medical documents are intended to carry relevant information, facts as evident, and the clinical opinion of the practitioner. The medical note is intended as peer to peer communication and may appear blunt or direct. It is written in medical language and may contain abbreviations or verbiage that are unfamiliar.

## 2024-05-01 NOTE — CM/SW NOTE
SW met with pt at bedside for DC planning. Recommendations for HHC. Provided list of accepting HH agencies.     KANDI Colon

## 2024-05-01 NOTE — PLAN OF CARE
Received pt at 2200  Pt AOx4, NSR, 2L NC, VSS  PRN Reglan  PRN Castaner for abd pain (-)BM  L groin leticia w/ light bruising  R groin w/ gauze & tape w/ bruising  R IJ w/ old blood   Frequent urination. UA sent  D/c Planning: PT rec home w/ HH once medically cleared  Call light within reach.  Needs currently met       Problem: RESPIRATORY - ADULT  Goal: Achieves optimal ventilation and oxygenation  Description: INTERVENTIONS:  - Assess for changes in respiratory status  - Assess for changes in mentation and behavior  - Position to facilitate oxygenation and minimize respiratory effort  - Oxygen supplementation based on oxygen saturation or ABGs  - Provide Smoking Cessation handout, if applicable  - Encourage broncho-pulmonary hygiene including cough, deep breathe, Incentive Spirometry  - Assess the need for suctioning and perform as needed  - Assess and instruct to report SOB or any respiratory difficulty  - Respiratory Therapy support as indicated  - Manage/alleviate anxiety  - Monitor for signs/symptoms of CO2 retention  Outcome: Progressing     Problem: GASTROINTESTINAL - ADULT  Goal: Minimal or absence of nausea and vomiting  Description: INTERVENTIONS:  - Maintain adequate hydration with IV or PO as ordered and tolerated  - Nasogastric tube to low intermittent suction as ordered  - Evaluate effectiveness of ordered antiemetic medications  - Provide nonpharmacologic comfort measures as appropriate  - Advance diet as tolerated, if ordered  - Obtain nutritional consult as needed  - Evaluate fluid balance  Outcome: Progressing  Goal: Maintains or returns to baseline bowel function  Description: INTERVENTIONS:  - Assess bowel function  - Maintain adequate hydration with IV or PO as ordered and tolerated  - Evaluate effectiveness of GI medications  - Encourage mobilization and activity  - Obtain nutritional consult as needed  - Establish a toileting routine/schedule  - Consider collaborating with pharmacy to review  patient's medication profile  Outcome: Progressing     Problem: GENITOURINARY - ADULT  Goal: Absence of urinary retention  Description: INTERVENTIONS:  - Assess patient’s ability to void and empty bladder  - Monitor intake/output and perform bladder scan as needed  - Follow urinary retention protocol/standard of care  - Consider collaborating with pharmacy to review patient's medication profile  - Implement strategies to promote bladder emptying  Outcome: Progressing     Problem: METABOLIC/FLUID AND ELECTROLYTES - ADULT  Goal: Hemodynamic stability and optimal renal function maintained  Description: INTERVENTIONS:  - Monitor labs and assess for signs and symptoms of volume excess or deficit  - Monitor intake, output and patient weight  - Monitor urine specific gravity, serum osmolarity and serum sodium as indicated or ordered  - Monitor response to interventions for patient's volume status, including labs, urine output, blood pressure (other measures as available)  - Encourage oral intake as appropriate  - Instruct patient on fluid and nutrition restrictions as appropriate  Outcome: Progressing     Problem: HEMATOLOGIC - ADULT  Goal: Maintains hematologic stability  Description: INTERVENTIONS  - Assess for signs and symptoms of bleeding or hemorrhage  - Monitor labs and vital signs for trends  - Administer supportive blood products/factors, fluids and medications as ordered and appropriate  - Administer supportive blood products/factors as ordered and appropriate  Outcome: Progressing  Goal: Free from bleeding injury  Description: (Example usage: patient with low platelets)  INTERVENTIONS:  - Avoid intramuscular injections, enemas and rectal medication administration  - Ensure safe mobilization of patient  - Hold pressure on venipuncture sites to achieve adequate hemostasis  - Assess for signs and symptoms of internal bleeding  - Monitor lab trends  - Patient is to report abnormal signs of bleeding to staff  - Avoid  use of toothpicks and dental floss  - Use electric shaver for shaving  - Use soft bristle tooth brush  - Limit straining and forceful nose blowing  Outcome: Progressing

## 2024-05-02 LAB
BLOOD TYPE BARCODE: 5100
UNIT VOLUME: 350 ML

## 2024-05-02 PROCEDURE — 97165 OT EVAL LOW COMPLEX 30 MIN: CPT

## 2024-05-02 PROCEDURE — 97535 SELF CARE MNGMENT TRAINING: CPT

## 2024-05-02 PROCEDURE — 97116 GAIT TRAINING THERAPY: CPT

## 2024-05-02 RX ORDER — MAGNESIUM CARB/ALUMINUM HYDROX 105-160MG
296 TABLET,CHEWABLE ORAL ONCE
Status: DISCONTINUED | OUTPATIENT
Start: 2024-05-02 | End: 2024-05-02

## 2024-05-02 RX ORDER — ASPIRIN 81 MG/1
81 TABLET ORAL DAILY
Qty: 90 TABLET | Refills: 3 | Status: SHIPPED | OUTPATIENT
Start: 2024-05-02

## 2024-05-02 RX ORDER — BISACODYL 10 MG
10 SUPPOSITORY, RECTAL RECTAL ONCE
Qty: 1 SUPPOSITORY | Refills: 0 | Status: SHIPPED | OUTPATIENT
Start: 2024-05-02 | End: 2024-05-02

## 2024-05-02 RX ORDER — BISACODYL 10 MG
10 SUPPOSITORY, RECTAL RECTAL
Qty: 30 SUPPOSITORY | Refills: 0 | Status: SHIPPED | OUTPATIENT
Start: 2024-05-02

## 2024-05-02 NOTE — PROGRESS NOTES
Vascular surgery progress note    Patient seen and examined.  Patient still with some abdominal discomfort due to constipation.  I informed her that the CTA demonstrates wide patency of the stents with no endoleak.  His groins are flat with expected ecchymosis but no hematoma.  His feet are warm and well-perfused.  He is ambulating.  He is tolerating p.o.  Will give him aggressive bowel regimen today.  Pending no issues plan for discharge tomorrow.  Further management per hospitalist and other consulting services.

## 2024-05-02 NOTE — CM/SW NOTE
VERONICA met with pt at bedside, anticipating DC today. Reviewed HH list, pt politely declining HH services at this time. Notified HH providers.     KANDI Colon

## 2024-05-02 NOTE — PLAN OF CARE
Received pt at 1930  Pt AOx4, NSR, RA, VSS  (-)BM  Bilat groins GRACIELA. Bruising noted R>L  R IJ cdi  D/c Planning: PT rec home w/ HH once medically cleared. 5/2?  Call light within reach.  Needs currently met    Problem: RESPIRATORY - ADULT  Goal: Achieves optimal ventilation and oxygenation  Description: INTERVENTIONS:  - Assess for changes in respiratory status  - Assess for changes in mentation and behavior  - Position to facilitate oxygenation and minimize respiratory effort  - Oxygen supplementation based on oxygen saturation or ABGs  - Provide Smoking Cessation handout, if applicable  - Encourage broncho-pulmonary hygiene including cough, deep breathe, Incentive Spirometry  - Assess the need for suctioning and perform as needed  - Assess and instruct to report SOB or any respiratory difficulty  - Respiratory Therapy support as indicated  - Manage/alleviate anxiety  - Monitor for signs/symptoms of CO2 retention  Outcome: Progressing     Problem: GASTROINTESTINAL - ADULT  Goal: Minimal or absence of nausea and vomiting  Description: INTERVENTIONS:  - Maintain adequate hydration with IV or PO as ordered and tolerated  - Nasogastric tube to low intermittent suction as ordered  - Evaluate effectiveness of ordered antiemetic medications  - Provide nonpharmacologic comfort measures as appropriate  - Advance diet as tolerated, if ordered  - Obtain nutritional consult as needed  - Evaluate fluid balance  Outcome: Progressing  Goal: Maintains or returns to baseline bowel function  Description: INTERVENTIONS:  - Assess bowel function  - Maintain adequate hydration with IV or PO as ordered and tolerated  - Evaluate effectiveness of GI medications  - Encourage mobilization and activity  - Obtain nutritional consult as needed  - Establish a toileting routine/schedule  - Consider collaborating with pharmacy to review patient's medication profile  Outcome: Progressing     Problem: GENITOURINARY - ADULT  Goal: Absence of urinary  retention  Description: INTERVENTIONS:  - Assess patient’s ability to void and empty bladder  - Monitor intake/output and perform bladder scan as needed  - Follow urinary retention protocol/standard of care  - Consider collaborating with pharmacy to review patient's medication profile  - Implement strategies to promote bladder emptying  Outcome: Progressing     Problem: METABOLIC/FLUID AND ELECTROLYTES - ADULT  Goal: Hemodynamic stability and optimal renal function maintained  Description: INTERVENTIONS:  - Monitor labs and assess for signs and symptoms of volume excess or deficit  - Monitor intake, output and patient weight  - Monitor urine specific gravity, serum osmolarity and serum sodium as indicated or ordered  - Monitor response to interventions for patient's volume status, including labs, urine output, blood pressure (other measures as available)  - Encourage oral intake as appropriate  - Instruct patient on fluid and nutrition restrictions as appropriate  Outcome: Progressing     Problem: HEMATOLOGIC - ADULT  Goal: Maintains hematologic stability  Description: INTERVENTIONS  - Assess for signs and symptoms of bleeding or hemorrhage  - Monitor labs and vital signs for trends  - Administer supportive blood products/factors, fluids and medications as ordered and appropriate  - Administer supportive blood products/factors as ordered and appropriate  Outcome: Progressing  Goal: Free from bleeding injury  Description: (Example usage: patient with low platelets)  INTERVENTIONS:  - Avoid intramuscular injections, enemas and rectal medication administration  - Ensure safe mobilization of patient  - Hold pressure on venipuncture sites to achieve adequate hemostasis  - Assess for signs and symptoms of internal bleeding  - Monitor lab trends  - Patient is to report abnormal signs of bleeding to staff  - Avoid use of toothpicks and dental floss  - Use electric shaver for shaving  - Use soft bristle tooth brush  - Limit  straining and forceful nose blowing  Outcome: Progressing

## 2024-05-02 NOTE — PLAN OF CARE
NURSING DISCHARGE NOTE    Assumed care at 0730  Alert and oriented x4  RA. NSR on tele. VSS  Lower back pain and abd pain. Pt declined the norco, but took tylenol for abd pain.   Suppository given this morning. Small BM x3 throughout the day. Miralax and colace given. Milk of mag given this afternoon. Pain improved   IJ dressing c/d/I  Groin sites w/ bruising, intact  Up 1 w/ walker to bathroom  Wife updated on POC  Safety precautions in place    Medically cleared for discharge. IV removed. Tele removed. Discharge education given to pt at bedside. All questions answered. Verbalized understanding.     Discharged Home via Wheelchair.  Accompanied by Family member and RN  Belongings Taken by patient/family.

## 2024-05-02 NOTE — OCCUPATIONAL THERAPY NOTE
OCCUPATIONAL THERAPY EVALUATION - INPATIENT    Room Number: 7623/7623-A  Evaluation Date: 5/2/2024     Type of Evaluation: Initial  Presenting Problem: S/p Fenestrated endovascular abdominal aortic aneurysm repair 4/29    Physician Order: IP Consult to Occupational Therapy  Reason for Therapy:  ADL/IADL Dysfunction and Discharge Planning      OCCUPATIONAL THERAPY ASSESSMENT   Patient is a 78 year old male admitted on 4/29/2024 with Presenting Problem: S/p Fenestrated endovascular abdominal aortic aneurysm repair 4/29. Co-Morbidities : smoking, COPD, just renal abdominal aortic aneurysm, glaucoma, peripheral vascular disease  Patient is currently functioning near baseline with self care and functional mobility.  Prior to admission, patient's baseline is independent.  Patient met all OT goals at mod I level.  Patient reports no further questions/concerns at this time.     Patient will be discharged from OT services at this time. Will benefit from home at discharge from the hospital.           WEIGHT BEARING RESTRICTION  Weight Bearing Restriction: None                Recommendations for nursing staff:   Transfers: 1 person RW  Toileting location: Toilet    EVALUATION SESSION:  Patient at start of session: supine  FUNCTIONAL TRANSFER ASSESSMENT  Sit to Stand: Edge of Bed  Edge of Bed: Modified Independent  Toilet Transfer: Modified Independent    BED MOBILITY  Rolling: Modified Independent  Supine to Sit : Modified Independent  Sit to Supine (OT): Modified Independent    BALANCE ASSESSMENT  Static Sitting: Independent  Static Standing: Modified Independent  Standing Bilateral: Modified Independent    FUNCTIONAL ADL ASSESSMENT  LB Dressing Seated: Modified Independent  Toileting Seated: Modified Independent      ACTIVITY TOLERANCE: vitals stable  Pulse: 93  Heart Rate Source: Monitor  Resp: 18                O2 SATURATIONS  Oxygen Therapy  SPO2% on Room Air at Rest: 95    COGNITION  Overall Cognitive Status:  WFL -  within functional limits  COGNITION ASSESSMENTS       Upper Extremity:   ROM: within functional limits   Strength: is within functional limits   Coordination:  Gross motor: WFL  Fine motor: WFL  Sensation: Light touch:  intact    EDUCATION PROVIDED  Patient: Role of Occupational Therapy; Plan of Care; Adaptive Equipment Recommendations; DME Recommendations; Functional Transfer Techniques; Weight Bear Status; Surgical Precautions; Posture/Positioning; Energy Conservation; Compensatory ADL Techniques; Proper Body Mechanics  Patient's Response to Education: Verbalized Understanding  Family/Caregiver: Role of Occupational Therapy; Plan of Care; Discharge Recommendations; Adaptive Equipment Recommendations; DME Recommendations  Family/Caregiver's Response to Education: Verbalized Understanding    Equipment used: RW, gait belt  Demonstrates functional use    Therapist comments: pleasant patient, agreeable to all presented tasks. Patient met all therapy goals with Mod I while following multistep directions without difficulty. Patient is Aox4. Patient educated on the role and goal of OT, AE recommendations. Patient and wife verbalized understanding.     Patient End of Session: In bed;Needs met;Call light within reach;RN aware of session/findings;All patient questions and concerns addressed;Alarm set;Family present    OCCUPATIONAL PROFILE    HOME SITUATION  Type of Home: House  Home Layout: Two level (able to live on the main level)  Lives With: Spouse    Toilet and Equipment: Standard height toilet  Shower/Tub and Equipment: Tub-shower combo  Other Equipment: None    Occupation/Status: retired  Hand Dominance: Right  Drives: Yes  Patient Regularly Uses: Glasses    Prior Level of Function: typically patient is independent with self care and functional mobility. Patient like in a house with spouse. Patient has a history of a fall. Patient is able to live in the main level at discharge.     SUBJECTIVE  \"I need to use the  bathroom\"     PAIN ASSESSMENT  Ratin  Location: denies       OBJECTIVE  Precautions: None  Fall Risk: High fall risk    WEIGHT BEARING RESTRICTION  Weight Bearing Restriction: None                AM-PAC ‘6-Clicks’ Inpatient Daily Activity Short Form  -   Putting on and taking off regular lower body clothing?: None  -   Bathing (including washing, rinsing, drying)?: None  -   Toileting, which includes using toilet, bedpan or urinal? : None  -   Putting on and taking off regular upper body clothing?: None  -   Taking care of personal grooming such as brushing teeth?: None  -   Eating meals?: None    AM-PAC Score:  Score: 24  Approx Degree of Impairment: 0%  Standardized Score (AM-PAC Scale): 57.54      ADDITIONAL TESTS     NEUROLOGICAL FINDINGS        PLAN   Patient has been evaluated and presents with no skilled Occupational Therapy needs at this time.  Patient discharged from Occupational Therapy services.  Please re-order if a new functional limitation presents during this admission.      Patient Evaluation Complexity Level:   Occupational Profile/Medical History LOW - Brief history including review of medical or therapy records    Specific performance deficits impacting engagement in ADL/IADL LOW  1 - 3 performance deficits    Client Assessment/Performance Deficits LOW - No comorbidities nor modifications of tasks    Clinical Decision Making LOW - Analysis of occupational profile, problem-focused assessments, limited treatment options    Overall Complexity LOW     OT Session Time: 30 minutes  Self-Care Home Management: 15 minutes

## 2024-05-02 NOTE — PROGRESS NOTES
[facility]   part of [facility]     Hospitalist Progress Note     Harpreet Lee Patient Status:  Inpatient    1945 MRN [id number]   Location [facility] 6NE-A Attending Warren Martin MD   Hosp Day # 3 PCP Delmis Barnard MD     Chief Complaint: post op EVAR    Subjective:     Patient doing well, abdominal pain significantly improved, did have a small bowel movement yesterday    Objective:    Review of Systems:   A comprehensive review of systems was completed; pertinent positive and negatives stated in subjective.    Vital signs:  Temp:  [97.1 °F (36.2 °C)-98.5 °F (36.9 °C)] 97.7 °F (36.5 °C)  Pulse:  [] 83  Resp:  [15-20] 18  BP: (137-153)/(75-95) 148/76  SpO2:  [91 %-95 %] 94 %    Physical Exam:        Gen: No acute distress, alert and oriented x 3 however moaning and groaning and sleepy  Pulm: Lungs clear bilaterally, good inspiratory effort   CV:  nL S1/S2  Abd: soft, NT/ND, no hepatomegaly, +BS groin incisions clean dry and intact, right incision slightly oozing with a small hematoma around it-  MSK: moving all extremities, no edema as above  Neuro: no focal deficits  Skin: no rashes/lesions  Psych: normal mood/affect    Diagnostic Data:    Labs:  Recent Labs   Lab 24  1853   WBC  --  11.5*  --    HGB 12.2* 12.1* 11.8*   MCV  --  89.0  --    PLT  --  81.0*  --    INR  --  1.09  --        Recent Labs   Lab 24  1451   *  --    BUN 15  --    CREATSERUM 0.75 0.75   CA 8.6  --    *  --    K 3.9  --      --    CO2 25.0  --        Estimated Creatinine Clearance: 77.8 mL/min (based on SCr of 0.75 mg/dL).    No results for input(s): \"TROP\", \"TROPHS\", \"CK\" in the last 168 hours.    Recent Labs   Lab 24  040   PTP 14.2   INR 1.09                  Microbiology    Hospital Encounter on 24   1. Urine Culture, Routine     Status: None    Collection Time: 24  3:15 AM    Specimen: Urine, clean catch    Result Value Ref Range    Urine Culture No Growth at 18-24 hrs. N/A         Imaging: Reviewed in Epic.    Medications:    magnesium citrate  296 mL Oral Once    bisacodyl  10 mg Rectal Once    docusate sodium  100 mg Oral BID    clopidogrel  75 mg Oral Daily    umeclidinium-vilanterol  1 puff Inhalation Daily    heparin  5,000 Units Subcutaneous Q8H DAYA    nicotine  1 patch Transdermal Daily    Tafluprost (PF)  1 drop Both Eyes Daily       Assessment & Plan:        Patient is an 78-year-old male with signal past medical history of smoking, COPD, just renal abdominal aortic aneurysm, glaucoma, peripheral vascular disease presented status post just renal abdominal aortic aneurysm repair,     # Abdominal aortic aneurysm status post repair  # Fenestrated EVAR  -Postop day 3, pain control, postop antibiotics, fluids per vascular surgery  -Dopplerable pedal pulses bilaterally present  -Check groin for hematoma-appears to be stable  -Pain control  -Abdominal pain is significant improved, CT abdomen pelvis reviewed  -Once patient has good bowel movement can be discharged  DC when okay with vascular surgery  -Resume Plavix     # Smoking history of COPD  #  -Nicotine patch     # Peripheral vascular disease  -Resume Plavix    Subha Sol MD    Supplementary Documentation:     Quality:  DVT Mechanical Prophylaxis:   SCDs,    DVT Pharmacologic Prophylaxis   Medication    heparin (Porcine) 5000 UNIT/ML injection 5,000 Units                Code Status: Full Code  Lockhart: No urinary catheter in place  Lockhart Duration (in days):   Central line:    NARA: 5/1/2024  The 21st Century Cures Act makes medical notes like these available to patients in the interest of transparency. Please be advised this is a medical document. Medical documents are intended to carry relevant information, facts as evident, and the clinical opinion of the practitioner. The medical note is intended as peer to peer communication and may appear blunt or  direct. It is written in medical language and may contain abbreviations or verbiage that are unfamiliar.

## 2024-05-02 NOTE — PROGRESS NOTES
Vascular Surgery Progress Note    /76 (BP Location: Right arm)   Pulse 83   Temp 97.7 °F (36.5 °C) (Oral)   Resp 18   Ht 5' 10\" (1.778 m)   Wt 154 lb 6.4 oz (70 kg)   SpO2 94%   BMI 22.15 kg/m²     Recent Labs   Lab 04/29/24 2128 04/30/24 0407 05/01/24  1853   RBC  --  3.90  --    HGB 12.2* 12.1* 11.8*   HCT 37.2* 34.7*  --    MCV  --  89.0  --    MCH  --  31.0  --    MCHC  --  34.9  --    RDW  --  12.9  --    WBC  --  11.5*  --    PLT  --  81.0*  --        Recent Labs   Lab 04/30/24 0407 05/01/24  1451   *  --    BUN 15  --    CREATSERUM 0.75 0.75   CA 8.6  --    *  --    K 3.9  --      --    CO2 25.0  --        Abdominal pain resolved after several Bms. Ok for dc. RTC in 3 weeks.    Facundo Horne MD  [facility]  Vascular Surgery

## 2024-05-02 NOTE — PHYSICAL THERAPY NOTE
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  Co-Morbidities : smoking, COPD, just renal abdominal aortic aneurysm, glaucoma, peripheral vascular disease    History related to current admission: Patient is a 78 year old male admitted on 4/29/2024: Presented for planned procedure -S/p Fenestrated endovascular abdominal aortic aneurysm repair 4/29        HOME SITUATION  Type of Home: House   Home Layout: Two level (able to live on the main level)     Lives With: Spouse  Drives: Yes  Patient Owned Equipment: Rolling walker  Patient Regularly Uses: Glasses     Prior Level of [place]: Pt reports normally indpe with ADLs and ambulation, enjoys car related things, has DME at home from his spouse having a hx of falling down the basement stairs after tripping over the cat and breaking multiple bones , states would be able to stay on the main level at dc but shower is upstairs       ASSESSMENT   Patient demonstrates good  progress this session, goals  progressing with 2/4 met this session.    Patient continues to function near baseline with bed mobility, transfers, gait, stair negotiation, maintaining seated position, and standing prolonged periods.  Contributing factors to remaining limitations include decreased endurance/aerobic capacity, pain, impaired standing balance, decreased muscular endurance, and decreased compliance/participation.  Next session anticipate patient to progress bed mobility, transfers, gait, and stair negotiation.  Physical Therapy will continue to follow patient for duration of hospitalization.    Patient continues to benefit from continued skilled PT services: at discharge to promote functional independence and safety with additional support and return home with home health PT.    PLAN  PT Treatment Plan: Bed  mobility;Body mechanics;Coordination;Endurance;Energy conservation;Patient education;Family education;Gait training;Neuromuscular re-educate;Range of motion;Strengthening;Stoop training;Stair training;Transfer training;Balance training  Rehab Potential : Good  Frequency (Obs): 3-5x/week    CURRENT GOALS   Goal #1 Patient is able to demonstrate supine - sit EOB @ level: supervision  MET   Goal #2 Patient is able to demonstrate transfers EOB to/from Chair/Wheelchair at assistance level: supervision  MET   Goal #3 Patient is able to ambulate 150 feet with assist device: walker - rolling at assistance level: supervision     Goal #4 Patient is able to navigate stairs with rail and SBA   Goal #5    Goal #6    Goal Comments: Goals established on 2024 all goals ongoing     SUBJECTIVE  \"I have to use the bathroom\"     OBJECTIVE  Precautions: None    WEIGHT BEARING RESTRICTION  Weight Bearing Restriction: None                PAIN ASSESSMENT   Ratin  Location: abdomen  Management Techniques: Activity promotion;Body mechanics;Repositioning    BALANCE                                                                                                                       Static Sitting: Fair  Dynamic Sitting: Fair -           Static Standing: Poor +  Dynamic Standing: Poor +    ACTIVITY TOLERANCE                         O2 WALK         AM-PAC '6-Clicks' INPATIENT SHORT FORM - BASIC MOBILITY  How much difficulty does the patient currently have...  Patient Difficulty: Turning over in bed (including adjusting bedclothes, sheets and blankets)?: None   Patient Difficulty: Sitting down on and standing up from a chair with arms (e.g., wheelchair, bedside commode, etc.): None   Patient Difficulty: Moving from lying on back to sitting on the side of the bed?: None   How much help from another person does the patient currently need...   Help from Another: Moving to and from a bed to a chair (including a wheelchair)?: A  Little   Help from Another: Need to walk in hospital room?: A Little   Help from Another: Climbing 3-5 steps with a railing?: A Little       AM-PAC Score:  Raw Score: 21   Approx Degree of Impairment: 28.97%   Standardized Score (AM-PAC Scale): 50.25   CMS Modifier (G-Code): CJ    FUNCTIONAL ABILITY STATUS  Gait Assessment   Functional Mobility/Gait Assessment  Gait Assistance: Contact guard assist;Supervision  Distance (ft): 250  Assistive Device: Rolling walker  Pattern:  (short step/stride length)  Stairs: Stairs  How Many Stairs: 3  Device: 1 Rail  Assist: Contact guard assist  Pattern: Ascend and Descend  Ascend and Descend : Reciprocal    Skilled Therapy Provided    Bed Mobility:  Rolling: NT   Supine<>Sit: ind   Sit<>Supine: ind     Transfer Mobility:  Sit<>Stand: supervision   Stand<>Sit: supervision   Gait: CGA    Therapist's Comments: RN cleared for session. Pt agreeable for therapy, received up in bathroom. Pt encouraged to perform step to pattern on stairs for improved stability and balance with addition of use of grab bar. Pt also instructed on continued use of RW for optimal balance as pt attempted to ambulate to bathroom without. Instructed to call for nursing staff for any needs and OOB mobility.         Patient End of Session: Needs met;RN aware of session/findings;All patient questions and concerns addressed;In bathroom - nursing staff aware;With  staff;Family present    PT Session Time: 20 minutes  Gait Training: 10 minutes  Therapeutic Activity: 5 minutes

## 2024-05-03 VITALS
BODY MASS INDEX: 22.1 KG/M2 | DIASTOLIC BLOOD PRESSURE: 61 MMHG | WEIGHT: 154.38 LBS | TEMPERATURE: 98 F | OXYGEN SATURATION: 96 % | SYSTOLIC BLOOD PRESSURE: 143 MMHG | HEART RATE: 93 BPM | RESPIRATION RATE: 18 BRPM | HEIGHT: 70 IN

## 2024-05-03 NOTE — PAYOR COMM NOTE
Discharge Notification    Patient Name: ALBA FIELD  Payor: ELIOT HARRY  Subscriber #: [id number]  Authorization Number: [id number]  Admit Date/Time: 4/29/2024 9:21 AM  Discharge Date/Time: 5/2/2024 6:17 PM

## 2024-05-03 NOTE — PAYOR COMM NOTE
--------------  4/30 5/1:  CONTINUED STAY REVIEW    Payor: [facility]  Subscriber #:  [id number]  Authorization Number: [id number]    Admit date: 4/29/24  Admit time:  9:21 AM        [facility]     PATIENT'S NAME: ALBA FIELD   ATTENDING PHYSICIAN: Warren Martin M.D.   OPERATING PHYSICIAN: Warren Martin M.D.   PATIENT ACCOUNT#:   [id number]    LOCATION:  [address] [place] [address]  MEDICAL RECORD #:   [id number]       YOB: 1945  ADMISSION DATE:       04/29/2024      OPERATION DATE:  04/29/2024     OPERATIVE REPORT      PREOPERATIVE DIAGNOSIS:  Type 4 thoracoabdominal aortic aneurysm.  POSTOPERATIVE DIAGNOSIS:  Type 4 thoracoabdominal aortic aneurysm.  PROCEDURE:  1.       Ultrasound-guided percutaneous access of bilateral common femoral arteries.  2.       Aortogram with radiologic supervision and interpretation.  3.       Intravascular ultrasound of the left external iliac, left common iliac, right common iliac, right external iliac, and aorta with radiologic supervision and interpretation.  4.       Selective catheterization of first order branch of the aorta, celiac, superior mesenteric artery, right renal, left renal.  5.       Thoracic endovascular aortic repair, CPT 65885 with Cook ZDEG 38 x 38 x 199.  6.       Fenestrated endovascular aortic repair with placement of 4 fenestrations, celiac, superior mesenteric artery, left renal, and right renal, CPT 28713; celiac 8 x 29 VBX, superior mesenteric artery 8 x 29 VBX, left renal 6 x 29 VBX, right renal 6 x 29 VBX; Lott Excluder 36 mm, 27 mm left limb, right 24 mm.  7.       Closure of bilateral groins with Perclose preclose technique, 18-Anguillan right, 22-Anguillan left.     CO-SURGEONS:  Warren Martin MD and Facundo Horne MD.  Due to the complexity of the case and the patient's significant comorbidities, I requested Dr. Horne to participate as co-surgeon.     ANESTHESIA:  General.     ESTIMATED BLOOD LOSS:  200 mL.      COMPLICATIONS:  None.     DRAINS:  None.     DISPOSITION:  Patient was extubated and taken to the ICU in stable condition.     INDICATIONS:  This is an 78-year-old male with a known large paravisceral aneurysm.  We discussed that he would require a type 4 thoracoabdominal aortic aneurysm repair.  We are proceeding as described below.     FINDINGS:  No endoleak with preservation of flow in all visceral vessels.     OPERATIVE TECHNIQUE:  The patient was taken to the operating room, placed under general anesthesia.  Patient was prepped and draped in the usual sterile fashion.  Time-out performed.  Prior to the initiation, we performed a back table fenestrated graft creation based on preoperative measurements on the center line; 4 fenestrations were placed on the graft with ophthalmic cautery.  Micropuncture wire was used to suture and reinforce each fenestration with 4-0 Ethibond suture.  Diameter-reducing ties were placed circumferentially around each Z stent with 4-0 chromic suture which was used to reduce the diameter by greater than 50%.  Graft was then re-constrained with an umbilical tape and re-sheathed.  Graft was then ready for use.  Using ultrasound, we then percutaneously accessed the femoral artery on each side.  I percutaneously accessed the left.  Dr. Horne percutaneously accessed the right.  Simultaneously, we then advanced J-wires into the aorta, exchanged the micropuncture sheaths for 6-[place] sheaths, then placed 2 Perclose devices in preclose technique and then placed two 8-[place] sheaths, 1 on the left and 1 on the right.  The patient was then systemically heparinized.  On the patient's left, I then advanced a Glidewire Advantage and a Kumpe to the level of thoracic aorta and exchanged for a Lunderquist wire.  I then advanced an 0.035 IVUS catheter and performed intravascular ultrasound of the left external iliac, left common iliac, and aorta to determine and verify the location of the celiac  SMA and bilateral renal arteries.  We confirmed under fluoroscopy proper orientation of the graft on the right.  I then predilated a tract, advanced device with a 16-Algerian sheath and advanced this in the descending thoracic aorta above the celiac artery without any issues.  We then unsheathed it in the descending thoracic aorta.  Dr. Horne then selected the graft and then advanced an 18-Algerian sheath up the right groin into the device.  With the graft still preconstrained, Dr. Horne exchanged for a TourGuide and Glidewire and was able to select the superior mesenteric artery confirmed with cannulation via contrast angiogram with placement of Magic Torque wire.  I then used a TourGuide with a Glidewire and a Quick-Cross catheter to select the left renal fenestration.  I was able to advance this with some manipulation to the left renal and confirmed this via contrast angiogram.  Magic Torque was put in place.  Dr. Horne then selected the right renal artery followed by a Magic Torque placement.  I then selected the celiac artery and placed a Magic Torque wire.  This was followed by placement of a 6-Algerian Jackson and a 6-Algerian 6 x 29 VBX in the renal arteries bilaterally.  We then completed the deployment of the graft without any issues.  I then advanced the balloon and Dr. Horne inflated the balloon to fracture the diameter-reducing ties.  This was performed without issue.  We then began stenting the renal arteries.  I partially withdrew the sheath from the right renal artery and deployed the right renal stent without any issue.  We then postdilated this with a 10 x 2 balloon.  Contrast angiogram revealed wide patency without any evidence of an endoleak.  Sheath and wire were removed.  I then proceeded with stenting the left renal fenestration.  I partially withdrew the sheath and Dr. Horne deployed the stent without any issue.  We then used a 10 x 2 flaring balloon and repeat angiogram was performed  and demonstrated no evidence of an endoleak.  Wire and sheath were removed.  I then placed a TourGuide sheath from the superior mesenteric artery followed by an 8 x 29 VBX.  We deployed this without any issue and inflated the proximal segment with a 10 mm balloon.  Repeat contrast angiogram revealed no further residual stenosis and wide patency and complete seal of the segment.  All wires and catheters were withdrawn from this.  In the celiac artery, we then advanced a TourGuide sheath through this into the celiac artery.  Dr. Horne then advanced an 8 x 29 VBX.  This was then deployed without any issues by myself and flared with a 10 x 2 balloon.  Repeat contrast angiogram revealed wide patency and complete seal of the segment.  All wires and catheters were withdrawn from this.  From the right, we  then exchanged for a [place] Excluder device and the [place] Excluder main body was deployed below the right renal fenestration with exposure of the contralateral gate.  I then utilized a Kumpe catheter and a Glidewire to select the gate which was exchanged for a Pilar wire.  Intravascular ultrasound was advanced into the left external iliac, into the left common iliac, and into the aorta confirming cannulation of the gate.  The remainder of the device on the left was deployed without any issue.  A 27 mm iliac extension was deployed in the distal left common iliac artery without any issues.  Dr. Horne then exchanged for the IVUS catheter in the right common iliac, right external iliac, and aorta.  We then confirmed the location in the distal right common iliac and the iliac bifurcation.  He then deployed a 24 mm limb that required additional extension to provide adequate seal within an additional overlapping 24 mm piece.  We then profiled the entire device below the renal arteries with the MOB balloon.  We then exchanged for a pigtail catheter up the left groin.  Aortogram and pelvic angiogram revealed wide patency of the  visceral vessels with no evidence of an endoleak.  At this point, we were satisfied and opted to terminate the procedure.  All catheters and wires were removed and the Perclose devices were secured.  Heparin was reversed with protamine.  Upon completion, there was evidence of excellent hemostasis.  Doppler signals were confirmed in bilateral lower extremities.  The patient was awakened from anesthesia and taken to Recovery in stable condition.     Dictated By Warren Martin M.D.  d:04/30/2024 12:09:43  t:04/30/2024 17:37:55  [id number]  MJG/                 5/1 HOSPITALIST:    Chief Complaint: post op EVAR        Subjective:  Patient doing well still complaining some pain in the abdomen, has not voided or had a bowel movement yet, central venous catheter has been removed patient okay to be transferred to the floor     Assessment & Plan:    Patient is an 78-year-old male with signal past medical history of smoking, COPD, just renal abdominal aortic aneurysm, glaucoma, peripheral vascular disease presented status post just renal abdominal aortic aneurysm repair,     # Abdominal aortic aneurysm status post repair  # Fenestrated EVAR  -Postop day 2, pain control, postop antibiotics, fluids per vascular surgery  -Dopplerable pedal pulses bilaterally present  -Check groin for hematoma-appears to be stable  -Pain control  -Patient complains abdominal pain, continue to monitor defer further imaging to vascular surgery-checking a CT abdomen pelvis today  DC when okay with vascular surgery  -Resume Plavix     # Smoking history of COPD  #  -Nicotine patch     # Peripheral vascular disease  -Resume Plavix     Subha Sol MD        Vitals (last day) before discharge       Date/Time Temp Pulse Resp BP SpO2 Weight O2 Device O2 Flow Rate (L/min) Who    05/02/24 1817 -- 93 18 -- -- -- -- -- RB    05/02/24 1600 98.1 °F (36.7 °C) 102 23 143/61 96 % -- None (Room air) -- SS    05/02/24 1200 97.7 °F (36.5 °C) 83 18  148/76 94 % -- -- -- SS    05/02/24 0800 97.7 °F (36.5 °C) 86 16 142/91 95 % -- None (Room air) -- SS    05/02/24 0545 97.1 °F (36.2 °C) 101 15 141/75 95 % -- None (Room air) -- RP    05/02/24 0200 -- 85 16 -- -- -- -- -- RP    05/01/24 2345 98.5 °F (36.9 °C) 91 20 150/94 95 % -- None (Room air) -- RP    05/01/24 2115 -- 97 18 -- 92 % -- -- -- RP    05/01/24 2045 98.3 °F (36.8 °C) 94 18 137/87 91 % -- -- -- RP    05/01/24 1700 -- -- -- 153/95 -- -- -- --     05/01/24 1545 97.6 °F (36.4 °C) 93 15 164/90 91 % -- Nasal cannula 2 L/min MB    05/01/24 1200 97.5 °F (36.4 °C) 102 18 154/91 82 % -- Nasal cannula 2 L/min MB    05/01/24 1045 -- 108 15 -- 96 % -- -- -- MB    05/01/24 0800 98.2 °F (36.8 °C) 90 21 152/91 99 % -- Nasal cannula 2 L/min MB    05/01/24 0621 -- 83 18 -- 99 % -- -- -- RP    05/01/24 0530 -- 81 15 -- 96 % -- -- -- RP    05/01/24 0430 -- -- -- -- -- -- -- 2 L/min NA    05/01/24 0430 98.5 °F (36.9 °C) 92 15 147/98 96 % -- Nasal cannula -- RP    05/01/24 0215 -- 94 20 -- 98 % -- -- -- RP    05/01/24 0123 -- 78 13 -- 98 % -- -- -- RP    05/01/24 0000 98.7 °F (37.1 °C) 86 15 154/83 99 % -- Nasal cannula 3 L/min RP

## 2024-09-17 ENCOUNTER — HOSPITAL ENCOUNTER (EMERGENCY)
Facility: HOSPITAL | Age: 79
Discharge: HOME | End: 2024-09-18
Attending: EMERGENCY MEDICINE
Payer: COMMERCIAL
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
## 2024-09-17 DIAGNOSIS — S09.90XA INJURY OF HEAD, INITIAL ENCOUNTER: ICD-10-CM

## 2024-09-17 DIAGNOSIS — S01.81XA FACIAL LACERATION, INITIAL ENCOUNTER: ICD-10-CM

## 2024-09-17 DIAGNOSIS — T14.8XXA SKIN AVULSION: ICD-10-CM

## 2024-09-17 DIAGNOSIS — S50.10XA: ICD-10-CM

## 2024-09-17 PROCEDURE — 12014 RPR F/E/E/N/L/M 5.1-7.5 CM: CPT

## 2024-09-17 PROCEDURE — 99284 EMERGENCY DEPT VISIT MOD MDM: CPT

## 2024-09-18 ENCOUNTER — APPOINTMENT (OUTPATIENT)
Dept: CT IMAGING | Facility: HOSPITAL | Age: 79
End: 2024-09-18
Attending: EMERGENCY MEDICINE
Payer: COMMERCIAL

## 2024-09-18 VITALS
DIASTOLIC BLOOD PRESSURE: 79 MMHG | RESPIRATION RATE: 17 BRPM | TEMPERATURE: 98 F | HEART RATE: 75 BPM | OXYGEN SATURATION: 99 % | WEIGHT: 156 LBS | SYSTOLIC BLOOD PRESSURE: 135 MMHG | BODY MASS INDEX: 22 KG/M2

## 2024-09-18 PROCEDURE — 76377 3D RENDER W/INTRP POSTPROCES: CPT | Performed by: EMERGENCY MEDICINE

## 2024-09-18 PROCEDURE — 70486 CT MAXILLOFACIAL W/O DYE: CPT | Performed by: EMERGENCY MEDICINE

## 2024-09-18 PROCEDURE — 70450 CT HEAD/BRAIN W/O DYE: CPT | Performed by: EMERGENCY MEDICINE

## 2024-09-18 NOTE — ED PROVIDER NOTES
Patient Seen in: [facility] Emergency Department      History     Chief Complaint   Patient presents with    Fall    Laceration/Abrasion     Stated Complaint:     Subjective:   Patient is a 78-year-old male who presents emergency room for head injury.  Patient reports he was walking tripped and landed face forward on hardwood floors.  Patient had cataract surgery yesterday.  He landed directly on the right side of his face along the cheek.  He has a large skin avulsion and laceration on the right cheek.  He has swelling in the periorbital region but had baseline swelling due to his cataract surgery yesterday.  He also has skin tear on the left upper extremity no focal deficits on exam he is awake and alert and oriented x 3.  Patient denies any loss of consciousness.  Per patient's family he is up-to-date with tetanus shot.    The history is provided by the patient, the spouse and the EMS personnel.           Objective:   Past Medical History:    Cataract    COPD (chronic obstructive pulmonary disease) (HCC)    Glaucoma    History of blood transfusion    History of stomach ulcers    Peripheral vascular disease (HCC)    Pneumonia due to organism    Visual impairment    glasses              Past Surgical History:   Procedure Laterality Date    Colonoscopy N/A 10/22/2017    Procedure: COLONOSCOPY;  Surgeon: Mona Jacome MD;  Location:  ENDOSCOPY    Colonoscopy      Eye surgery  01/2017    right eye surgery for lower lid turned in     Tonsillectomy      Upper gi endoscopy,exam                  No pertinent social history.            Review of Systems   Skin:  Positive for wound.   Neurological:  Negative for headaches.       Positive for stated Chief Complaint: Fall and Laceration/Abrasion    Other systems are as noted in HPI.  Constitutional and vital signs reviewed.      All other systems reviewed and negative except as noted above.    Physical Exam     ED Triage Vitals   BP 09/17/24 2334 (!) 144/91    Pulse 09/17/24 2334 78   Resp 09/17/24 2334 19   Temp 09/17/24 2348 98.2 °F (36.8 °C)   Temp src 09/17/24 2348 Oral   SpO2 09/17/24 2334 98 %   O2 Device 09/17/24 2334 None (Room air)       Current Vitals:   Vital Signs  BP: (!) 144/91  Pulse: 76  Resp: 21  Temp: 98.2 °F (36.8 °C)  Temp src: Oral    Oxygen Therapy  SpO2: 98 %  O2 Device: None (Room air)            Physical Exam  Vitals and nursing note reviewed.   Constitutional:       General: He is not in acute distress.     Appearance: Normal appearance. He is normal weight. He is not ill-appearing or toxic-appearing.   HENT:      Head:      Comments: V-shaped skin avulsion/laceration left cheek actively bleeding compressive dressing placed mild oozing present periorbital swelling and bruising at baseline secondary to surgery yesterday for his cataract     Mouth/Throat:      Mouth: Mucous membranes are moist.   Eyes:      Extraocular Movements: Extraocular movements intact.   Cardiovascular:      Rate and Rhythm: Normal rate.   Pulmonary:      Effort: Pulmonary effort is normal.   Abdominal:      General: Bowel sounds are normal. There is no distension.      Palpations: Abdomen is soft.      Tenderness: There is no abdominal tenderness.   Musculoskeletal:         General: No swelling, tenderness or deformity. Normal range of motion.   Skin:     General: Skin is warm.      Capillary Refill: Capillary refill takes less than 2 seconds.      Comments: Skin tear left forearm   Neurological:      General: No focal deficit present.      Mental Status: He is alert and oriented to person, place, and time.   Psychiatric:         Mood and Affect: Mood normal.         Behavior: Behavior normal.               ED Course     Labs Reviewed   RAINBOW DRAW LAVENDER   RAINBOW DRAW LIGHT GREEN   RAINBOW DRAW BLUE   RAINBOW DRAW GOLD     CT facial bones shows motion artifact is present in the No acute osseous abnormality no evidence of fracture or dislocation.  Paranasal sinuses  are well aerated.  Orbital walls are intact.  Globes appear intact no definitive orbital abnormality no definitive retrobulbar hematoma.  Right sided preseptal periorbital soft tissue swelling probably associate with laceration.   CT scan of the brain shows again identified a partially calcified hyperdense extra-axial collection which does not appear significantly changed compared to previous study.  This is suggestive of chronic calcified subdural hematoma.  This measures 2.3 cm in maximal thickness along the left middle cranial fossa.  There is mild associated mass effect.  Which is similar to previous.  Cannot entirely exclude acute component although the lack of change in size would argue against this.  Involution of the chronic small vessel ischemic disease.  No evidence of parenchymal hemorrhage or mass.  Paranasal sinuses are well aerated no calvarial abnormality seen.     The wound was anesthetized using 6cc lido with epi. The wound was irrigated with normal saline. The wound was prepped and draped in the normal sterile fashion.  The wound was explored for foreign bodies and none were found. The edges were reapproximated using 5--0 Prolene suture,   however there is an area that was missing skin.  Bringing the edges together as best as I could this was a very complex laceration.  10stitches were placed,  wound length was approximated at 6cm.  Patient tolerated procedure well.  Bacitracin and bulky dressing was applied.                 MDM      Social -positive tobacco, negative etoh, negative drugs  Family History-noncontributory  Past Medical History-cataracts, COPD, glaucoma, pneumonia    Differential diagnosis before testing included head injury, facial laceration, contusion, fracture, intracranial hemorrhage, concussion, skin avulsion    Co-morbidities that add to the complexity of management include: Patient is status post cataract surgery yesterday    Testing ordered during this visit included CT scan  of the head and facial bones    Radiographic images  I personally reviewed the radiographs and my individual interpretation shows no intracranial hemorrhage or fracture no facial fracture  I also reviewed the official reports that showed CT facial bones shows motion artifact is present in the No acute osseous abnormality no evidence of fracture or dislocation.  Paranasal sinuses are well aerated.  Orbital walls are intact.  Globes appear intact no definitive orbital abnormality no definitive retrobulbar hematoma.  Right sided preseptal periorbital soft tissue swelling probably associate with laceration.   CT scan of the brain shows again identified a partially calcified hyperdense extra-axial collection which does not appear significantly changed compared to previous study.  This is suggestive of chronic calcified subdural hematoma.  This measures 2.3 cm in maximal thickness along the left middle cranial fossa.  There is mild associated mass effect.  Which is similar to previous.  Cannot entirely exclude acute component although the lack of change in size would argue against this.  Involution of the chronic small vessel ischemic disease.  No evidence of parenchymal hemorrhage or mass.  Paranasal sinuses are well aerated no calvarial abnormality seen.    External chart review showed review of Care Everywhere in epic system shows no related comorbidities to current presentation, patient is on blood thinners he is on Plavix and aspirin which she was not aware of what the names of his medicines were    History obtained by an independent source included from patient, family, EMS    Discussion of management with patient, family    Social determinants of health that affect care include not applicable      Medications Provided: Ice lidocaine    Course of Events during Emergency Room Visit include 78-year-old male status post fall with injury to right side of his face will get CT scan to rule out intracranial hemorrhage or  fracture will ice the area and attempt to reapproximate tissue.  Will perform a sepsis on wounds and compressive dressing.  Patient follow-up with primary care physician    CT scan shows no intracranial hemorrhage or fracture his laceration was repaired it was a very complex laceration.  Patient tolerated procedure well will recommend bacitracin and follow-up with his eye doctor to let them know about the recent trauma to the perisurgical area.  Patient to follow-up with primary care physician      Disposition:          Discharge  I have discussed with the patient the results of test, differential diagnosis, treatment plan, warning signs and symptoms which should prompt immediate return.  They expressed understanding of these instructions and agrees to the following plan provided.  They were given written discharge instructions and agrees to return for any concerns and voiced understanding and all questions were answered.                                      Medical Decision Making      Disposition and Plan     Clinical Impression:  1. Fall, initial encounter    2. Injury of head, initial encounter    3. Skin avulsion    4. Facial laceration, initial encounter         Disposition:  Discharge  9/18/2024  1:48 am    Follow-up:  Delmis Barnard MD  [address] [place] [id number]  [phone]    Schedule an appointment as soon as possible for a visit            Medications Prescribed:  Current Discharge Medication List

## 2024-09-18 NOTE — ED INITIAL ASSESSMENT (HPI)
Pt comes in  for fall. States he tripped over feet and landed face forward hitting head on wood floor. Denies loc, head, neck, & back pain. +thinners. Pt had eye sx on Monday for glaucoma. Lac under R. Eye  and left arm

## 2024-09-19 ENCOUNTER — HOSPITAL ENCOUNTER (EMERGENCY)
Facility: HOSPITAL | Age: 79
Discharge: HOME | End: 2024-09-19
Attending: EMERGENCY MEDICINE
Payer: COMMERCIAL

## 2024-09-19 VITALS
HEART RATE: 63 BPM | DIASTOLIC BLOOD PRESSURE: 80 MMHG | SYSTOLIC BLOOD PRESSURE: 137 MMHG | RESPIRATION RATE: 16 BRPM | OXYGEN SATURATION: 99 % | TEMPERATURE: 98 F

## 2024-09-19 VITALS
HEART RATE: 86 BPM | BODY MASS INDEX: 21.26 KG/M2 | OXYGEN SATURATION: 98 % | RESPIRATION RATE: 16 BRPM | TEMPERATURE: 98 F | HEIGHT: 72 IN | DIASTOLIC BLOOD PRESSURE: 63 MMHG | WEIGHT: 157 LBS | SYSTOLIC BLOOD PRESSURE: 139 MMHG

## 2024-09-19 DIAGNOSIS — T14.8XXA BLEEDING FROM WOUND: Primary | ICD-10-CM

## 2024-09-19 PROCEDURE — 99281 EMR DPT VST MAYX REQ PHY/QHP: CPT

## 2024-09-19 PROCEDURE — 99283 EMERGENCY DEPT VISIT LOW MDM: CPT

## 2024-09-19 RX ORDER — TRANEXAMIC ACID 100 MG/ML
INJECTION, SOLUTION INTRAVENOUS
Status: COMPLETED
Start: 2024-09-19 | End: 2024-09-19

## 2024-09-19 RX ORDER — TRANEXAMIC ACID 100 MG/ML
5 INJECTION, SOLUTION INTRAVENOUS ONCE
Status: COMPLETED | OUTPATIENT
Start: 2024-09-19 | End: 2024-09-19

## 2024-09-19 NOTE — ED QUICK NOTES
Patient had right eye cataract surgery on 9/17. Tripped over his own feet 9/17 evening, hit his right cheekbone off the hardwood floor. Was seen here in ER. Had CT Scans. Patient was seen by his eye surgeon the following morning, stated \"everything was fine with the surgery.\" Patient has been using guaze over the top of the suture site on his right cheekbone. Pulled the guaze off this AM and it began bleeding again. Thinks he may have pulled a stitch out. On Plavix and Aspirin.

## 2024-09-19 NOTE — ED INITIAL ASSESSMENT (HPI)
Pt arrives to ED for evaluation of bleeding, pt had foam placed in the ED a few hours ago, pt states the bleeding started up again as he was eating dinner. Pt arrives with a blood soaked guaze under right eye.

## 2024-09-19 NOTE — ED PROVIDER NOTES
Patient Seen in: [facility] Emergency Department      History     Chief Complaint   Patient presents with    Fall    Laceration/Abrasion     Stated Complaint: fall, lac to right cheek, glaucoma surgery 3 days ago    Subjective:   HPI    Patient is a 79-year-old male with a history of COPD, recent cataract surgery, who is on aspirin and Plavix presents ED for evaluation for concern of dehiscence of a right cheek wound.  Seen here about 2 days ago for a fall, after having glaucoma surgeon right eye.  He had CT scans of his head and face at that time, which were negative for fracture.  He had stitches placed in the right cheek.    Subsequent follow-up [facility] and according family his visit was reassuring with stable pressures in the right eye.   Today patient comes to the ED because he is noticing some oozing  a blood through the stitches.  There is no recurrent fall.  The blood in his eye has slowly starting to clear up and looks better today.  No acute vision change.  No other complaints.    Objective:   Past Medical History:    Cataract    COPD (chronic obstructive pulmonary disease) (HCC)    Glaucoma    History of blood transfusion    History of stomach ulcers    Peripheral vascular disease (HCC)    Pneumonia due to organism    Visual impairment    glasses              Past Surgical History:   Procedure Laterality Date    Colonoscopy N/A 10/22/2017    Procedure: COLONOSCOPY;  Surgeon: Mona Jacome MD;  Location:  ENDOSCOPY    Colonoscopy      Eye surgery  01/2017    right eye surgery for lower lid turned in     Tonsillectomy      Upper gi endoscopy,exam                  Social History     Socioeconomic History    Marital status:    Tobacco Use    Smoking status: Every Day     Current packs/day: 1.00     Average packs/day: 1 pack/day for 67.6 years (67.6 ttl pk-yrs)     Types: Cigarettes     Start date: 1/26/1957    Smokeless tobacco: Never    Tobacco comments:     was smoking  1ppd or more for >50 years   Vaping Use    Vaping status: Never Used   Substance and Sexual Activity    Alcohol use: No    Drug use: No    Sexual activity: Never     Social Determinants of Health     Food Insecurity: No Food Insecurity (4/29/2024)    Food Insecurity     Food Insecurity: Never true   Transportation Needs: No Transportation Needs (4/29/2024)    Transportation Needs     Lack of Transportation: No   Housing Stability: Low Risk  (4/29/2024)    Housing Stability     Housing Instability: No              Review of Systems    Positive for stated Chief Complaint: Fall and Laceration/Abrasion    Other systems are as noted in HPI.  Constitutional and vital signs reviewed.      All other systems reviewed and negative except as noted above.    Physical Exam     ED Triage Vitals [09/19/24 1424]   /72   Pulse 94   Resp 16   Temp 98 °F (36.7 °C)   Temp src Temporal   SpO2 97 %   O2 Device None (Room air)       Current Vitals:   Vital Signs  BP: 137/80  Pulse: 63  Resp: 16  Temp: 98 °F (36.7 °C)  Temp src: Temporal  MAP (mmHg): 97    Oxygen Therapy  SpO2: 99 %  O2 Device: None (Room air)            Physical Exam  Vitals and nursing note reviewed.   Constitutional:       General: He is not in acute distress.     Appearance: He is well-developed. He is not toxic-appearing.   HENT:      Head: Normocephalic and atraumatic.      Comments: Right periorbital ecchymoses.  With bloody chemosis which is improved according to the patient.  Eyes:      Comments: Bloody chemosis noted right eye.   Cardiovascular:      Rate and Rhythm: Normal rate.   Pulmonary:      Effort: Pulmonary effort is normal. No respiratory distress.   Abdominal:      General: There is no distension.   Musculoskeletal:         General: No tenderness. Normal range of motion.      Cervical back: Normal range of motion and neck supple.   Skin:     General: Skin is warm and dry.      Findings: No rash.   Neurological:      Mental Status: He is alert and  oriented to person, place, and time.      Motor: No abnormal muscle tone.      Coordination: Coordination normal.   Psychiatric:         Behavior: Behavior normal.              ED Course   Labs Reviewed - No data to display                    MDM         -Pulse oximetry was interpreted by me and was normal.  Pulse oximeter was ordered to monitor patient for hypoxia.        -History source other than patient -partner        -Comorbidities did add complexity to the management are mentioned in the HPI above        -I personally reviewed the prior external notes and the medical record to obtain additional history reviewed last ED visit from 2 days ago.  Patient underwent CT of the brain and facial bones which were negative for any facial fractures or intracranial hemorrhage         -DDX: Includes but not limited to -wound dehiscence, wound infection        There is some oozing at the base of the wound.  There does not appear to be any obvious wound dehiscence.  We will apply some Gelfoam for hemostasis and applied dressing.  He is a follow-up appointment with PCP tomorrow.    He was evaluated for his right eye yesterday at [facility] and was cleared.  Feels like his vision is clearing up slowly in the right eye.  There is no evidence of wound infection at this time.  Patient will be discharged home with outpatient follow-up.                                     Medical Decision Making      Disposition and Plan     Clinical Impression:  1. Bleeding from wound         Disposition:  There is no disposition on file for this visit.  There is no disposition time on file for this visit.    Follow-up:  Delmis Barnard MD  [address] [facility]  [address] [place] [id number]  [phone]    Follow up in 1 day(s)            Medications Prescribed:  Current Discharge Medication List

## 2024-09-20 NOTE — ED QUICK NOTES
Patient seen 1 hour ago in ER by this RN and Dr. De. Patient as discharged with gelfoam in place to suture site from Monday. Patient states it began to ooze again after getting home. Patient takes Plavix, Aspirin. Very small amount of bleeding from suture site.

## 2024-09-20 NOTE — ED PROVIDER NOTES
Patient Seen in: [facility] Emergency Department      History     Chief Complaint   Patient presents with    Bleeding     Stated Complaint: laceration- bleeding, discharged an hour ago from ER    Subjective:   HPI    79-year-old male presents ED for evaluation for bleeding wound on his face.  I saw him few hours ago.  He had a bleeding wound from a laceration repaired on his right cheek.  He had Gelfoam applied hemostasis was achieved he went home.  Patient started having dinner and then the wound started to ooze again so he came to the ER for evaluation.    Objective:   No pertinent past medical history.            Past Surgical History:   Procedure Laterality Date    Colonoscopy N/A 10/22/2017    Procedure: COLONOSCOPY;  Surgeon: Mona Jacome MD;  Location:  ENDOSCOPY    Colonoscopy      Eye surgery  01/2017    right eye surgery for lower lid turned in     Tonsillectomy      Upper gi endoscopy,exam                  Social History     Socioeconomic History    Marital status:    Tobacco Use    Smoking status: Every Day     Current packs/day: 1.00     Average packs/day: 1 pack/day for 67.6 years (67.6 ttl pk-yrs)     Types: Cigarettes     Start date: 1/26/1957    Smokeless tobacco: Never    Tobacco comments:     was smoking 1ppd or more for >50 years   Vaping Use    Vaping status: Never Used   Substance and Sexual Activity    Alcohol use: No    Drug use: No    Sexual activity: Never     Social Determinants of Health     Food Insecurity: No Food Insecurity (4/29/2024)    Food Insecurity     Food Insecurity: Never true   Transportation Needs: No Transportation Needs (4/29/2024)    Transportation Needs     Lack of Transportation: No   Housing Stability: Low Risk  (4/29/2024)    Housing Stability     Housing Instability: No              Review of Systems    Positive for stated Chief Complaint: Bleeding    Other systems are as noted in HPI.  Constitutional and vital signs reviewed.      All other  systems reviewed and negative except as noted above.    Physical Exam     ED Triage Vitals [09/19/24 1841]   /63   Pulse 86   Resp 16   Temp 97.6 °F (36.4 °C)   Temp src Temporal   SpO2 98 %   O2 Device None (Room air)       Current Vitals:   Vital Signs  BP: 139/63  Pulse: 86  Resp: 16  Temp: 97.6 °F (36.4 °C)  Temp src: Temporal    Oxygen Therapy  SpO2: 98 %  O2 Device: None (Room air)            Physical Exam  Vitals and nursing note reviewed.   Constitutional:       General: He is not in acute distress.  Skin:     Comments: Right infraorbital cheek laceration wound noted.  slight oozing at the base of the wound   Neurological:      Mental Status: He is alert.              ED Course   Labs Reviewed - No data to display                    MDM            -History source other than patient - wife        -Comorbidities did add complexity to the management are mentioned in the HPI above        -I personally reviewed the prior external notes and the medical record to obtain additional history -reviewed patient last ED visit by myself as well as previous ED visit and [facility] note    Wound was oversewn to achieve hemostasis.  Patient tolerated well.  No recurrent bleeding.    Laceration was anesthetized with lidocaine with epinephrine locally.  The wound was cleansed There was no visible foreign body, vessel, nerve, or bone  within the wound. The wound was closed using a simple closure with 6-0 Prolene.  The quality of the closure was excellent                                         Medical Decision Making      Disposition and Plan     Clinical Impression:  1. Bleeding from wound         Disposition:  Discharge  9/19/2024  7:50 pm    Follow-up:  No follow-up provider specified.        Medications Prescribed:  Current Discharge Medication List

## (undated) DEVICE — ANGIO PACK-LF: Brand: MEDLINE INDUSTRIES, INC.

## (undated) DEVICE — Device: Brand: VISIONS PV .035 DIGITAL IVUS CATHETER

## (undated) DEVICE — Device

## (undated) DEVICE — NEEDLE CONTRAST INTERJECT 25G

## (undated) DEVICE — SNARE CAPTIFLEX MICRO-OVL OLY

## (undated) DEVICE — 1200CC GUARDIAN II: Brand: GUARDIAN

## (undated) DEVICE — CATH GOLD PROBE HEMOGLIDE 7FR

## (undated) DEVICE — PROXIMATE RH ROTATING HEAD SKIN STAPLERS (35 WIDE) CONTAINS 35 STAINLESS STEEL STAPLES: Brand: PROXIMATE

## (undated) DEVICE — PERCLOSE™ PROSTYLE™ SUTURE-MEDIATED CLOSURE AND REPAIR SYSTEM: Brand: PERCLOSE™ PROSTYLE™

## (undated) DEVICE — STEERABLE GUIDEWIRE: Brand: BACK-UP MEIER

## (undated) DEVICE — CATHETER PTCA BLLN L4CM INFL 10-37MM CATH

## (undated) DEVICE — ADVANCE, 35LP LOW PROFILE PTA BALLOON DILATATION CATHETER: Brand: ADVANCE

## (undated) DEVICE — REM POLYHESIVE ADULT PATIENT RETURN ELECTRODE: Brand: VALLEYLAB

## (undated) DEVICE — KIT INFL DEV 20ML W/ INSRT TOOL 3 W STPCOCK

## (undated) DEVICE — 3M™ RED DOT™ MONITORING ELECTRODE WITH FOAM TAPE AND STICKY GEL, 50/BAG, 20/CASE, 72/PLT 2570: Brand: RED DOT™

## (undated) DEVICE — SUT CHRM GUT 3-0 27IN SH ABSRB UD 26MM 1/2

## (undated) DEVICE — COVER,TABLE,44X90,STERILE: Brand: MEDLINE

## (undated) DEVICE — 3M™ BAIR HUGGER® UNDERBODY BLANKET, FULL ACCESS, 10 PER CASE 63500: Brand: BAIR HUGGER™

## (undated) DEVICE — Device: Brand: DEFENDO AIR/WATER/SUCTION AND BIOPSY VALVE

## (undated) DEVICE — GUIDEWIRE: Brand: NITINOL GUIDEWIRE

## (undated) DEVICE — FILTERLINE NASAL ADULT O2/CO2

## (undated) DEVICE — SOLUTION IRRIG 1000ML 0.9% NACL USP BTL

## (undated) DEVICE — GLIDEX™ COATED HYDROPHILIC GUIDEWIRE: Brand: MAGIC TORQUE™ DLVR

## (undated) DEVICE — HIGH TEMPERATURE CAUTERY: Brand: ACCU-TEMP®

## (undated) DEVICE — PINNACLE INTRODUCER SHEATH: Brand: PINNACLE

## (undated) DEVICE — ROSEN CURVED WIRE GUIDE: Brand: ROSEN

## (undated) DEVICE — RADIFOCUS GLIDEWIRE: Brand: GLIDEWIRE

## (undated) DEVICE — FORCEP BIOPSY RJ4 LG CAP W/ND

## (undated) DEVICE — ENDOSCOPY PACK UPPER: Brand: MEDLINE INDUSTRIES, INC.

## (undated) DEVICE — FIXED CORE WIRE GUIDE SAFE-T-J, CURVED: Brand: COOK

## (undated) DEVICE — 3M™ IOBAN™ 2 ANTIMICROBIAL INCISE DRAPE 6651EZ: Brand: IOBAN™ 2

## (undated) DEVICE — SLEEVE COMPR MD KNEE LEN SGL USE KENDALL SCD

## (undated) DEVICE — CV PACK-LF: Brand: MEDLINE INDUSTRIES, INC.

## (undated) DEVICE — ANGIOGRAPHIC CATHETER: Brand: EXPO™

## (undated) DEVICE — RADIFOCUS GLIDEWIRE ADVANTAGE GUIDEWIRE: Brand: GLIDEWIRE ADVANTAGE

## (undated) DEVICE — ENDOSCOPY PACK - LOWER: Brand: MEDLINE INDUSTRIES, INC.

## (undated) DEVICE — 3M™ TEGADERM™ TRANSPARENT FILM DRESSING FRAME STYLE, 1626W, 4 IN X 4-3/4 IN (10 CM X 12 CM), 50/CT 4CT/CASE: Brand: 3M™ TEGADERM™

## (undated) DEVICE — TRAY CATH 16FR F INCL BARDX IC COMPLT CARE

## (undated) DEVICE — SUT ETHBND XL 4-0 30IN RB-1 NABSRB GRN 17MM 1

## (undated) DEVICE — ADHESIVE SKIN TOP FOR WND CLSR DERMBND ADV

## (undated) DEVICE — BEACON TIP TORCON NB ADVANTAGE CATHETER: Brand: BEACON TIP TORCON NB

## (undated) DEVICE — TRAP 4 CPTR CHMBR N EZ INLN

## (undated) DEVICE — FLEXOR, CHECK-FLO, INTRODUCER ANSEL MODIFICATION - WITH HIGH-FLEX DILATOR AND HYDROPHILIC COATING: Brand: FLEXOR

## (undated) NOTE — LETTER
[facility]  Ed[address] [facility], [address] [place] [address]    Consent for Operation    Date: __________________    Time: _______________    1.  I authorize the performance upon Sandy Layman the following operation:    Procedure(s):  ESOPHAGOGASTRODUODEN revealed by the pictures or by descriptive texts accompanying them. If the procedure has been videotaped, the surgeon will obtain the original videotape. The hospital will not be responsible for storage or maintenance of this tape.     6. For the purpose of THAT MY DOCTOR PROVIDED ME WITH THE ABOVE EXPLANATIONS, THAT ALL BLANKS OR STATEMENTS REQUIRING INSERTION OR COMPLETION WERE FILLED IN.     Signature of Patient:   ___________________________    When the patient is a minor or mentally incompetent to give co supplements, and pills I can buy without a prescription (including street drugs/illegal medications). Failure to inform my anesthesiologist about these medicines may increase my risk of anesthetic complications.   · If I am allergic to anything or have had Anesthesiologist Signature     Date   Time  I have discussed the procedure and information above with the patient (or patient’s representative) and answered their questions. The patient or their representative has agreed to have anesthesia services.     ___

## (undated) NOTE — LETTER
Date: 10/30/2017  Patient Name:  Radha Ocampo             Address: [address] [facility] [address]    : 1945    Dear Radha Ocampo,    I hope this letter finds you well.   The results of the biopsies taken from your stomach lining wer

## (undated) NOTE — LETTER
12/07/17    Edmundo Lee      To Whom It May Concern: The above patient was seen at [facility] for treatment of a medical condition from 12/5/2017-12/7/2017. The patient may return to work on 12/11/17 without any limitations.       Sincerely,

## (undated) NOTE — LETTER
[facility]  Edmar Rowe 59 [address]    Consent for Operation    Date: __________________    Time: _______________    1.  I authorize the performance upon Kelton Whitfield the following operation:    Procedure(s):  ESOPHAGOGASTRODUODEN procedure has been videotaped, the surgeon will obtain the original videotape. The hospital will not be responsible for storage or maintenance of this tape.     6. For the purpose of advancing medical education, I consent to the admittance of observers to t STATEMENTS REQUIRING INSERTION OR COMPLETION WERE FILLED IN.     Signature of Patient:   ___________________________    When the patient is a minor or mentally incompetent to give consent:  Signature of person authorized to consent for patient: ____________ drugs/illegal medications). Failure to inform my anesthesiologist about these medicines may increase my risk of anesthetic complications. · If I am allergic to anything or have had a reaction to anesthesia before.     3. I understand how the anesthesia med I have discussed the procedure and information above with the patient (or patient’s representative) and answered their questions. The patient or their representative has agreed to have anesthesia services.     _______________________________________________

## (undated) NOTE — LETTER
October 11, 2019      Patient: Natacha Faith   YOB: 1945   Date of Visit: 10/8/2019         To Whom It May Concern: This certifies that Natacha Faith has been under my care at [facility] from 10/8/19-10/11/19.  He may return to

## (undated) NOTE — ED AVS SNAPSHOT
[facility] in [address] [place] [address] Drive,[address]    [address] [place] [address]    Phone:  [phone]    Fax:  [phone] [phone]   MRN: [id number]    Department:  [facility] in [facility]   Date of Visit:  5/12/2017 for Wheezing or Shortness of Breath. What changed: This medication is already on your medication list.  Do NOT take both doses of the new and old medication. ONLY take the dose prescribed today.        predniSONE 20 MG Tabs   Quantity:  10 tablet   Comm If you have any problems with your follow-up, please call our  at [phone]. Si usted tiene algun problema con jones sequimiento, por favor llame a nuestro adminstrador de casos al [phone].     Expect to receive an electronic reques Barbie WalLouises [address] ([address]) Andreia Cooper [id number]   [facility]. ([facility]) [address]  (Do Approved by:  Lea Curiel MD              Narrative:    PROCEDURE:  XR CHEST PA + LAT CHEST (CPT=71020)     INDICATIONS:  sob     COMPARISON:  BRIANNA XR CHEST PA + LAT CHEST (CPT=71020), 5/07/2016, 10:36.     TECHNIQUE:  PA and lateral chest radiog

## (undated) NOTE — LETTER
[facility] [address], [address] [place] [address]    Consent for Anesthesia   1.    Josep Doherty agree to be cared for by an anesthesiologist, who is specially trained to monitor me and give me medicine to put me to sleep or keep me comforta vision, nerves, or muscles and in extremely rare instances death. 5. My doctor has explained to me other choices available to me for my care (alternatives).   6. Pregnant Patients (“epidural”):  I understand that the risks of having an epidural (medicine g

## (undated) NOTE — LETTER
[facility]  Robdiamond Estevesnatalee [address] [facility], [address] [place] [address]    Consent for Operation    Date: __________________    Time: _______________    1.  I authorize the performance upon Daihsa Bullock the following operation:    Procedure(s):  COLONOSCOPY WITH POS procedure has been videotaped, the surgeon will obtain the original videotape. The hospital will not be responsible for storage or maintenance of this tape.     6. For the purpose of advancing medical education, I consent to the admittance of observers to t STATEMENTS REQUIRING INSERTION OR COMPLETION WERE FILLED IN.     Signature of Patient:   ___________________________    When the patient is a minor or mentally incompetent to give consent:  Signature of person authorized to consent for patient: ____________ drugs/illegal medications). Failure to inform my anesthesiologist about these medicines may increase my risk of anesthetic complications. · If I am allergic to anything or have had a reaction to anesthesia before.     3. I understand how the anesthesia med I have discussed the procedure and information above with the patient (or patient’s representative) and answered their questions. The patient or their representative has agreed to have anesthesia services.     _______________________________________________